# Patient Record
Sex: MALE | Race: WHITE | NOT HISPANIC OR LATINO | Employment: UNEMPLOYED | URBAN - METROPOLITAN AREA
[De-identification: names, ages, dates, MRNs, and addresses within clinical notes are randomized per-mention and may not be internally consistent; named-entity substitution may affect disease eponyms.]

---

## 2017-01-01 ENCOUNTER — LAB (OUTPATIENT)
Dept: LAB | Facility: HOSPITAL | Age: 57
End: 2017-01-01

## 2017-01-01 ENCOUNTER — OFFICE VISIT (OUTPATIENT)
Dept: ONCOLOGY | Facility: CLINIC | Age: 57
End: 2017-01-01

## 2017-01-01 ENCOUNTER — HOSPITAL ENCOUNTER (OUTPATIENT)
Dept: CT IMAGING | Facility: HOSPITAL | Age: 57
Discharge: HOME | End: 2017-07-17
Attending: INTERNAL MEDICINE | Admitting: INTERNAL MEDICINE

## 2017-01-01 ENCOUNTER — APPOINTMENT (OUTPATIENT)
Dept: ONCOLOGY | Facility: HOSPITAL | Age: 57
End: 2017-01-01

## 2017-01-01 ENCOUNTER — HOSPITAL ENCOUNTER (OUTPATIENT)
Dept: CT IMAGING | Facility: HOSPITAL | Age: 57
Discharge: HOME | End: 2017-12-04
Attending: INTERNAL MEDICINE | Admitting: INTERNAL MEDICINE

## 2017-01-01 ENCOUNTER — INFUSION (OUTPATIENT)
Dept: ONCOLOGY | Facility: HOSPITAL | Age: 57
End: 2017-01-01

## 2017-01-01 ENCOUNTER — TELEPHONE (OUTPATIENT)
Dept: ONCOLOGY | Facility: CLINIC | Age: 57
End: 2017-01-01

## 2017-01-01 VITALS
DIASTOLIC BLOOD PRESSURE: 97 MMHG | TEMPERATURE: 97.6 F | HEART RATE: 95 BPM | RESPIRATION RATE: 15 BRPM | WEIGHT: 192 LBS | SYSTOLIC BLOOD PRESSURE: 180 MMHG | BODY MASS INDEX: 29.19 KG/M2

## 2017-01-01 VITALS
DIASTOLIC BLOOD PRESSURE: 95 MMHG | SYSTOLIC BLOOD PRESSURE: 198 MMHG | TEMPERATURE: 97.1 F | HEART RATE: 78 BPM | RESPIRATION RATE: 21 BRPM | BODY MASS INDEX: 28.89 KG/M2 | WEIGHT: 190 LBS

## 2017-01-01 VITALS
RESPIRATION RATE: 18 BRPM | TEMPERATURE: 97 F | WEIGHT: 190.1 LBS | BODY MASS INDEX: 28.9 KG/M2 | SYSTOLIC BLOOD PRESSURE: 206 MMHG | DIASTOLIC BLOOD PRESSURE: 101 MMHG | HEART RATE: 83 BPM

## 2017-01-01 VITALS
WEIGHT: 196 LBS | TEMPERATURE: 97.2 F | BODY MASS INDEX: 29.8 KG/M2 | SYSTOLIC BLOOD PRESSURE: 179 MMHG | RESPIRATION RATE: 18 BRPM | HEART RATE: 88 BPM | DIASTOLIC BLOOD PRESSURE: 83 MMHG

## 2017-01-01 VITALS
BODY MASS INDEX: 29.04 KG/M2 | SYSTOLIC BLOOD PRESSURE: 166 MMHG | DIASTOLIC BLOOD PRESSURE: 90 MMHG | HEART RATE: 106 BPM | RESPIRATION RATE: 18 BRPM | TEMPERATURE: 97.2 F | WEIGHT: 191 LBS

## 2017-01-01 DIAGNOSIS — C77.9 METASTATIC MELANOMA TO LYMPH NODE (HCC): ICD-10-CM

## 2017-01-01 DIAGNOSIS — C77.9 METASTATIC MELANOMA TO LYMPH NODE (HCC): Primary | ICD-10-CM

## 2017-01-01 LAB
ALBUMIN SERPL-MCNC: 3.7 G/DL (ref 3.2–4.8)
ALBUMIN SERPL-MCNC: 4.1 G/DL (ref 3.5–5)
ALBUMIN SERPL-MCNC: 4.2 G/DL (ref 3.5–5)
ALBUMIN SERPL-MCNC: 4.3 G/DL (ref 3.5–5)
ALBUMIN/GLOB SERPL: 0.8 G/DL (ref 1.5–2.5)
ALBUMIN/GLOB SERPL: 0.9 G/DL (ref 1–2)
ALBUMIN/GLOB SERPL: 1.1 G/DL (ref 1–2)
ALBUMIN/GLOB SERPL: 1.1 G/DL (ref 1–2)
ALP SERPL-CCNC: 190 U/L (ref 38–126)
ALP SERPL-CCNC: 205 U/L (ref 25–100)
ALP SERPL-CCNC: 208 U/L (ref 38–126)
ALP SERPL-CCNC: 293 U/L (ref 38–126)
ALT SERPL W P-5'-P-CCNC: 128 U/L (ref 13–69)
ALT SERPL W P-5'-P-CCNC: 141 U/L (ref 13–69)
ALT SERPL W P-5'-P-CCNC: 52 U/L (ref 7–40)
ALT SERPL W P-5'-P-CCNC: 66 U/L (ref 13–69)
ANION GAP SERPL CALCULATED.3IONS-SCNC: 11 MMOL/L (ref 3–11)
ANION GAP SERPL CALCULATED.3IONS-SCNC: 16 MMOL/L
ANION GAP SERPL CALCULATED.3IONS-SCNC: 16.1 MMOL/L
ANION GAP SERPL CALCULATED.3IONS-SCNC: 16.3 MMOL/L
AST SERPL-CCNC: 102 U/L (ref 0–33)
AST SERPL-CCNC: 146 U/L (ref 15–46)
AST SERPL-CCNC: 209 U/L (ref 15–46)
AST SERPL-CCNC: 384 U/L (ref 15–46)
BASOPHILS # BLD AUTO: 0.03 10*3/MM3 (ref 0–0.2)
BASOPHILS # BLD AUTO: 0.05 10*3/MM3 (ref 0–0.2)
BASOPHILS # BLD AUTO: 0.06 10*3/MM3 (ref 0–0.2)
BASOPHILS # BLD AUTO: 0.13 10*3/MM3 (ref 0–0.2)
BASOPHILS NFR BLD AUTO: 0.5 % (ref 0–1)
BASOPHILS NFR BLD AUTO: 0.5 % (ref 0–2.5)
BASOPHILS NFR BLD AUTO: 0.8 % (ref 0–2.5)
BASOPHILS NFR BLD AUTO: 1.6 % (ref 0–2.5)
BILIRUB SERPL-MCNC: 1.4 MG/DL (ref 0.3–1.2)
BILIRUB SERPL-MCNC: 2.8 MG/DL (ref 0.2–1.3)
BILIRUB SERPL-MCNC: 3 MG/DL (ref 0.2–1.3)
BILIRUB SERPL-MCNC: 4 MG/DL (ref 0.2–1.3)
BUN BLD-MCNC: 3 MG/DL (ref 7–20)
BUN BLD-MCNC: 4 MG/DL (ref 7–20)
BUN BLD-MCNC: 6 MG/DL (ref 9–23)
BUN BLD-MCNC: 8 MG/DL (ref 7–20)
BUN/CREAT SERPL: 11.4 (ref 6.3–21.9)
BUN/CREAT SERPL: 4 (ref 6.3–21.9)
BUN/CREAT SERPL: 4.3 (ref 6.3–21.9)
BUN/CREAT SERPL: 7.5 (ref 7–25)
CALCIUM SPEC-SCNC: 8.6 MG/DL (ref 8.4–10.2)
CALCIUM SPEC-SCNC: 9 MG/DL (ref 8.4–10.2)
CALCIUM SPEC-SCNC: 9.2 MG/DL (ref 8.4–10.2)
CALCIUM SPEC-SCNC: 9.4 MG/DL (ref 8.7–10.4)
CHLORIDE SERPL-SCNC: 92 MMOL/L (ref 99–109)
CHLORIDE SERPL-SCNC: 96 MMOL/L (ref 98–107)
CHLORIDE SERPL-SCNC: 96 MMOL/L (ref 98–107)
CHLORIDE SERPL-SCNC: 97 MMOL/L (ref 98–107)
CO2 SERPL-SCNC: 27 MMOL/L (ref 26–30)
CO2 SERPL-SCNC: 28 MMOL/L (ref 20–31)
CO2 SERPL-SCNC: 28 MMOL/L (ref 26–30)
CO2 SERPL-SCNC: 30 MMOL/L (ref 26–30)
CREAT BLD-MCNC: 0.7 MG/DL (ref 0.6–1.3)
CREAT BLD-MCNC: 0.7 MG/DL (ref 0.6–1.3)
CREAT BLD-MCNC: 0.8 MG/DL (ref 0.6–1.3)
CREAT BLD-MCNC: 1 MG/DL (ref 0.6–1.3)
CREAT BLDA-MCNC: 0.8 MG/DL (ref 0.6–1.3)
DEPRECATED RDW RBC AUTO: 47.7 FL (ref 37–54)
DEPRECATED RDW RBC AUTO: 48.1 FL (ref 37–54)
DEPRECATED RDW RBC AUTO: 49.1 FL (ref 37–54)
DEPRECATED RDW RBC AUTO: 53.6 FL (ref 37–54)
EOSINOPHIL # BLD AUTO: 0.09 10*3/MM3 (ref 0–0.3)
EOSINOPHIL # BLD AUTO: 0.19 10*3/MM3 (ref 0–0.7)
EOSINOPHIL # BLD AUTO: 0.65 10*3/MM3 (ref 0–0.7)
EOSINOPHIL # BLD AUTO: 1.24 10*3/MM3 (ref 0–0.7)
EOSINOPHIL NFR BLD AUTO: 1.4 % (ref 0–3)
EOSINOPHIL NFR BLD AUTO: 15.6 % (ref 0–7)
EOSINOPHIL NFR BLD AUTO: 3.1 % (ref 0–7)
EOSINOPHIL NFR BLD AUTO: 6 % (ref 0–7)
ERYTHROCYTE [DISTWIDTH] IN BLOOD BY AUTOMATED COUNT: 12.8 % (ref 11.5–14.5)
ERYTHROCYTE [DISTWIDTH] IN BLOOD BY AUTOMATED COUNT: 13.2 % (ref 11.5–14.5)
ERYTHROCYTE [DISTWIDTH] IN BLOOD BY AUTOMATED COUNT: 13.3 % (ref 11.5–14.5)
ERYTHROCYTE [DISTWIDTH] IN BLOOD BY AUTOMATED COUNT: 14.9 % (ref 11.3–14.5)
GFR SERPL CREATININE-BSD FRML MDRD: 101 ML/MIN/1.73
GFR SERPL CREATININE-BSD FRML MDRD: 117 ML/MIN/1.73
GFR SERPL CREATININE-BSD FRML MDRD: 117 ML/MIN/1.73
GFR SERPL CREATININE-BSD FRML MDRD: 78 ML/MIN/1.73
GLOBULIN UR ELPH-MCNC: 3.6 GM/DL
GLOBULIN UR ELPH-MCNC: 3.9 GM/DL
GLOBULIN UR ELPH-MCNC: 4.5 GM/DL
GLOBULIN UR ELPH-MCNC: 4.7 GM/DL
GLUCOSE BLD-MCNC: 146 MG/DL (ref 74–98)
GLUCOSE BLD-MCNC: 173 MG/DL (ref 74–98)
GLUCOSE BLD-MCNC: 177 MG/DL (ref 74–98)
GLUCOSE BLD-MCNC: 258 MG/DL (ref 70–100)
HCT VFR BLD AUTO: 35.6 % (ref 42–52)
HCT VFR BLD AUTO: 39.6 % (ref 42–52)
HCT VFR BLD AUTO: 45.8 % (ref 42–52)
HCT VFR BLD AUTO: 48.9 % (ref 38.9–50.9)
HGB BLD-MCNC: 12.4 G/DL (ref 14–18)
HGB BLD-MCNC: 13.6 G/DL (ref 14–18)
HGB BLD-MCNC: 16.1 G/DL (ref 14–18)
HGB BLD-MCNC: 16.6 G/DL (ref 13.1–17.5)
IMM GRANULOCYTES # BLD: 0.03 10*3/MM3 (ref 0–0.03)
IMM GRANULOCYTES # BLD: 0.04 10*3/MM3 (ref 0–0.06)
IMM GRANULOCYTES # BLD: 0.04 10*3/MM3 (ref 0–0.06)
IMM GRANULOCYTES # BLD: 0.13 10*3/MM3 (ref 0–0.06)
IMM GRANULOCYTES NFR BLD: 0.4 % (ref 0–0.6)
IMM GRANULOCYTES NFR BLD: 0.5 % (ref 0–0.6)
IMM GRANULOCYTES NFR BLD: 0.5 % (ref 0–0.6)
IMM GRANULOCYTES NFR BLD: 2.1 % (ref 0–0.6)
LYMPHOCYTES # BLD AUTO: 1.02 10*3/MM3 (ref 0.6–3.4)
LYMPHOCYTES # BLD AUTO: 1.07 10*3/MM3 (ref 0.6–3.4)
LYMPHOCYTES # BLD AUTO: 1.17 10*3/MM3 (ref 0.6–3.4)
LYMPHOCYTES # BLD AUTO: 1.23 10*3/MM3 (ref 0.6–4.8)
LYMPHOCYTES NFR BLD AUTO: 14.7 % (ref 10–50)
LYMPHOCYTES NFR BLD AUTO: 16.7 % (ref 10–50)
LYMPHOCYTES NFR BLD AUTO: 18.6 % (ref 24–44)
LYMPHOCYTES NFR BLD AUTO: 9.8 % (ref 10–50)
MCH RBC QN AUTO: 33.4 PG (ref 27–31)
MCH RBC QN AUTO: 34.5 PG (ref 27–31)
MCH RBC QN AUTO: 34.8 PG (ref 27–31)
MCH RBC QN AUTO: 35 PG (ref 27–31)
MCHC RBC AUTO-ENTMCNC: 33.9 G/DL (ref 32–36)
MCHC RBC AUTO-ENTMCNC: 34.3 G/DL (ref 30–37)
MCHC RBC AUTO-ENTMCNC: 34.8 G/DL (ref 30–37)
MCHC RBC AUTO-ENTMCNC: 35.2 G/DL (ref 30–37)
MCV RBC AUTO: 100.6 FL (ref 80–94)
MCV RBC AUTO: 101.3 FL (ref 80–94)
MCV RBC AUTO: 98.1 FL (ref 80–94)
MCV RBC AUTO: 98.4 FL (ref 80–99)
MONOCYTES # BLD AUTO: 0.4 10*3/MM3 (ref 0–1)
MONOCYTES # BLD AUTO: 0.58 10*3/MM3 (ref 0–0.9)
MONOCYTES # BLD AUTO: 0.66 10*3/MM3 (ref 0–0.9)
MONOCYTES # BLD AUTO: 0.92 10*3/MM3 (ref 0–0.9)
MONOCYTES NFR BLD AUTO: 10.8 % (ref 0–12)
MONOCYTES NFR BLD AUTO: 6.1 % (ref 0–12)
MONOCYTES NFR BLD AUTO: 7.3 % (ref 0–12)
MONOCYTES NFR BLD AUTO: 8.4 % (ref 0–12)
NEUTROPHILS # BLD AUTO: 4.07 10*3/MM3 (ref 2–6.9)
NEUTROPHILS # BLD AUTO: 4.81 10*3/MM3 (ref 2–6.9)
NEUTROPHILS # BLD AUTO: 4.82 10*3/MM3 (ref 1.5–8.3)
NEUTROPHILS # BLD AUTO: 8.18 10*3/MM3 (ref 2–6.9)
NEUTROPHILS NFR BLD AUTO: 60.3 % (ref 37–80)
NEUTROPHILS NFR BLD AUTO: 66.5 % (ref 37–80)
NEUTROPHILS NFR BLD AUTO: 72.9 % (ref 41–71)
NEUTROPHILS NFR BLD AUTO: 74.9 % (ref 37–80)
NRBC BLD MANUAL-RTO: 0 /100 WBC (ref 0–0)
PLATELET # BLD AUTO: 123 10*3/MM3 (ref 150–450)
PLATELET # BLD AUTO: 133 10*3/MM3 (ref 130–400)
PLATELET # BLD AUTO: 159 10*3/MM3 (ref 130–400)
PLATELET # BLD AUTO: 164 10*3/MM3 (ref 130–400)
PMV BLD AUTO: 10.2 FL (ref 6–12)
PMV BLD AUTO: 10.3 FL (ref 6–12)
PMV BLD AUTO: 10.3 FL (ref 6–12)
PMV BLD AUTO: 10.6 FL (ref 6–12)
POTASSIUM BLD-SCNC: 4 MMOL/L (ref 3.5–5.1)
POTASSIUM BLD-SCNC: 4.1 MMOL/L (ref 3.5–5.1)
POTASSIUM BLD-SCNC: 4.2 MMOL/L (ref 3.5–5.5)
POTASSIUM BLD-SCNC: 4.3 MMOL/L (ref 3.5–5.1)
PROT SERPL-MCNC: 7.7 G/DL (ref 6.3–8.2)
PROT SERPL-MCNC: 8.2 G/DL (ref 5.7–8.2)
PROT SERPL-MCNC: 8.2 G/DL (ref 6.3–8.2)
PROT SERPL-MCNC: 8.9 G/DL (ref 6.3–8.2)
RBC # BLD AUTO: 3.54 10*6/MM3 (ref 4.7–6.1)
RBC # BLD AUTO: 3.91 10*6/MM3 (ref 4.7–6.1)
RBC # BLD AUTO: 4.67 10*6/MM3 (ref 4.7–6.1)
RBC # BLD AUTO: 4.97 10*6/MM3 (ref 4.2–5.76)
SODIUM BLD-SCNC: 131 MMOL/L (ref 132–146)
SODIUM BLD-SCNC: 135 MMOL/L (ref 137–145)
SODIUM BLD-SCNC: 137 MMOL/L (ref 137–145)
SODIUM BLD-SCNC: 138 MMOL/L (ref 137–145)
T4 FREE SERPL-MCNC: 0.37 NG/DL (ref 0.89–1.76)
T4 FREE SERPL-MCNC: 1.05 NG/DL (ref 0.78–2.19)
T4 FREE SERPL-MCNC: <0.07 NG/DL (ref 0.78–2.19)
TSH SERPL DL<=0.05 MIU/L-ACNC: 20.4 MIU/ML (ref 0.47–4.68)
TSH SERPL DL<=0.05 MIU/L-ACNC: 48.2 MIU/ML (ref 0.35–5.35)
TSH SERPL DL<=0.05 MIU/L-ACNC: 75.8 MIU/ML (ref 0.47–4.68)
WBC NRBC COR # BLD: 10.92 10*3/MM3 (ref 4.8–10.8)
WBC NRBC COR # BLD: 6.12 10*3/MM3 (ref 4.8–10.8)
WBC NRBC COR # BLD: 6.6 10*3/MM3 (ref 3.5–10.8)
WBC NRBC COR # BLD: 7.97 10*3/MM3 (ref 4.8–10.8)

## 2017-01-01 PROCEDURE — 80050 GENERAL HEALTH PANEL: CPT

## 2017-01-01 PROCEDURE — 96413 CHEMO IV INFUSION 1 HR: CPT

## 2017-01-01 PROCEDURE — G0463 HOSPITAL OUTPT CLINIC VISIT: HCPCS

## 2017-01-01 PROCEDURE — 36415 COLL VENOUS BLD VENIPUNCTURE: CPT

## 2017-01-01 PROCEDURE — 0 IOPAMIDOL 61 % SOLUTION: Performed by: INTERNAL MEDICINE

## 2017-01-01 PROCEDURE — 84439 ASSAY OF FREE THYROXINE: CPT

## 2017-01-01 PROCEDURE — 99215 OFFICE O/P EST HI 40 MIN: CPT | Performed by: INTERNAL MEDICINE

## 2017-01-01 PROCEDURE — 84439 ASSAY OF FREE THYROXINE: CPT | Performed by: INTERNAL MEDICINE

## 2017-01-01 PROCEDURE — 25010000002 NIVOLUMAB 100 MG/10ML SOLUTION 10 ML VIAL: Performed by: NURSE PRACTITIONER

## 2017-01-01 PROCEDURE — 71260 CT THORAX DX C+: CPT

## 2017-01-01 PROCEDURE — 25010000002 NIVOLUMAB 100 MG/10ML SOLUTION 4 ML VIAL: Performed by: NURSE PRACTITIONER

## 2017-01-01 PROCEDURE — 74177 CT ABD & PELVIS W/CONTRAST: CPT

## 2017-01-01 PROCEDURE — 80050 GENERAL HEALTH PANEL: CPT | Performed by: INTERNAL MEDICINE

## 2017-01-01 PROCEDURE — 82565 ASSAY OF CREATININE: CPT

## 2017-01-01 PROCEDURE — 80053 COMPREHEN METABOLIC PANEL: CPT | Performed by: INTERNAL MEDICINE

## 2017-01-01 PROCEDURE — 85025 COMPLETE CBC W/AUTO DIFF WBC: CPT | Performed by: INTERNAL MEDICINE

## 2017-01-01 RX ORDER — OXYCODONE HYDROCHLORIDE 5 MG/1
5 TABLET ORAL EVERY 6 HOURS PRN
Qty: 120 TABLET | Refills: 0 | Status: SHIPPED | OUTPATIENT
Start: 2017-01-01 | End: 2017-01-01 | Stop reason: SDUPTHER

## 2017-01-01 RX ORDER — LISINOPRIL 10 MG/1
10 TABLET ORAL DAILY
Qty: 30 TABLET | Refills: 2 | Status: SHIPPED | OUTPATIENT
Start: 2017-01-01 | End: 2018-01-01 | Stop reason: SDUPTHER

## 2017-01-01 RX ORDER — SODIUM CHLORIDE 9 MG/ML
250 INJECTION, SOLUTION INTRAVENOUS ONCE
Status: CANCELLED | OUTPATIENT
Start: 2017-01-01

## 2017-01-01 RX ORDER — LEVOTHYROXINE SODIUM 0.07 MG/1
75 TABLET ORAL DAILY
Qty: 30 TABLET | Refills: 3 | Status: SHIPPED | OUTPATIENT
Start: 2017-01-01 | End: 2017-01-01 | Stop reason: DRUGHIGH

## 2017-01-01 RX ORDER — LEVOTHYROXINE SODIUM 0.05 MG/1
50 TABLET ORAL DAILY
Qty: 7 TABLET | Refills: 0 | Status: SHIPPED | OUTPATIENT
Start: 2017-01-01 | End: 2017-01-01 | Stop reason: DRUGHIGH

## 2017-01-01 RX ORDER — PREDNISONE 10 MG/1
10 TABLET ORAL DAILY
Qty: 189 TABLET | Refills: 0 | Status: SHIPPED | OUTPATIENT
Start: 2017-01-01 | End: 2018-01-01 | Stop reason: SDUPTHER

## 2017-01-01 RX ORDER — SODIUM CHLORIDE 9 MG/ML
250 INJECTION, SOLUTION INTRAVENOUS ONCE
Status: COMPLETED | OUTPATIENT
Start: 2017-01-01 | End: 2017-01-01

## 2017-01-01 RX ORDER — LEVOTHYROXINE SODIUM 0.1 MG/1
100 TABLET ORAL DAILY
Qty: 30 TABLET | Refills: 5 | Status: SHIPPED | OUTPATIENT
Start: 2017-01-01

## 2017-01-01 RX ORDER — OXYCODONE HYDROCHLORIDE AND ACETAMINOPHEN 5; 325 MG/1; MG/1
1 TABLET ORAL EVERY 6 HOURS PRN
Qty: 120 TABLET | Refills: 0 | Status: SHIPPED | OUTPATIENT
Start: 2017-01-01 | End: 2017-01-01 | Stop reason: HOSPADM

## 2017-01-01 RX ORDER — ZOLPIDEM TARTRATE 5 MG/1
5 TABLET ORAL NIGHTLY PRN
Qty: 30 TABLET | Refills: 2 | OUTPATIENT
Start: 2017-01-01

## 2017-01-01 RX ORDER — OXYCODONE HYDROCHLORIDE 5 MG/1
5 TABLET ORAL EVERY 6 HOURS PRN
Qty: 120 TABLET | Refills: 0 | Status: SHIPPED | OUTPATIENT
Start: 2017-01-01 | End: 2018-01-01 | Stop reason: SDUPTHER

## 2017-01-01 RX ORDER — ONDANSETRON HYDROCHLORIDE 8 MG/1
8 TABLET, FILM COATED ORAL 3 TIMES DAILY PRN
Qty: 30 TABLET | Refills: 5 | Status: SHIPPED | OUTPATIENT
Start: 2017-01-01

## 2017-01-01 RX ORDER — LEVOTHYROXINE SODIUM 0.07 MG/1
75 TABLET ORAL DAILY
Qty: 30 TABLET | Refills: 3 | Status: SHIPPED | OUTPATIENT
Start: 2017-01-01 | End: 2017-01-01 | Stop reason: SDUPTHER

## 2017-01-01 RX ADMIN — SODIUM CHLORIDE 240 MG: 9 INJECTION, SOLUTION INTRAVENOUS at 11:27

## 2017-01-01 RX ADMIN — IOPAMIDOL 92 ML: 612 INJECTION, SOLUTION INTRAVENOUS at 13:00

## 2017-01-01 RX ADMIN — SODIUM CHLORIDE 250 ML: 9 INJECTION, SOLUTION INTRAVENOUS at 11:29

## 2017-01-01 RX ADMIN — IOPAMIDOL 95 ML: 612 INJECTION, SOLUTION INTRAVENOUS at 14:20

## 2017-02-12 ENCOUNTER — HOSPITAL ENCOUNTER (EMERGENCY)
Facility: HOSPITAL | Age: 57
Discharge: HOME | End: 2017-02-12
Attending: EMERGENCY MEDICINE | Admitting: EMERGENCY MEDICINE

## 2017-02-12 ENCOUNTER — APPOINTMENT (OUTPATIENT)
Dept: GENERAL RADIOLOGY | Facility: HOSPITAL | Age: 57
End: 2017-02-12

## 2017-02-12 ENCOUNTER — APPOINTMENT (OUTPATIENT)
Dept: CT IMAGING | Facility: HOSPITAL | Age: 57
End: 2017-02-12

## 2017-02-12 ENCOUNTER — APPOINTMENT (OUTPATIENT)
Dept: ULTRASOUND IMAGING | Facility: HOSPITAL | Age: 57
End: 2017-02-12

## 2017-02-12 VITALS
HEIGHT: 69 IN | WEIGHT: 245 LBS | OXYGEN SATURATION: 96 % | BODY MASS INDEX: 36.29 KG/M2 | TEMPERATURE: 98 F | DIASTOLIC BLOOD PRESSURE: 102 MMHG | SYSTOLIC BLOOD PRESSURE: 195 MMHG | HEART RATE: 88 BPM | RESPIRATION RATE: 20 BRPM

## 2017-02-12 DIAGNOSIS — R59.9 LYMPH NODE ENLARGEMENT: ICD-10-CM

## 2017-02-12 DIAGNOSIS — T07.XXXA MULTIPLE CONTUSIONS: Primary | ICD-10-CM

## 2017-02-12 LAB
ALBUMIN SERPL-MCNC: 4.2 G/DL (ref 3.5–5)
ALBUMIN/GLOB SERPL: 1.1 G/DL (ref 1–2)
ALP SERPL-CCNC: 127 U/L (ref 38–126)
ALT SERPL W P-5'-P-CCNC: 24 U/L (ref 13–69)
AMPHET+METHAMPHET UR QL: NEGATIVE
AMPHETAMINES UR QL: NEGATIVE
ANION GAP SERPL CALCULATED.3IONS-SCNC: 17.2 MMOL/L
AST SERPL-CCNC: 47 U/L (ref 15–46)
BARBITURATES UR QL SCN: NEGATIVE
BASOPHILS # BLD AUTO: 0.04 10*3/MM3 (ref 0–0.2)
BASOPHILS NFR BLD AUTO: 0.6 % (ref 0–2.5)
BENZODIAZ UR QL SCN: NEGATIVE
BILIRUB SERPL-MCNC: 1.1 MG/DL (ref 0.2–1.3)
BUN BLD-MCNC: 5 MG/DL (ref 7–20)
BUN/CREAT SERPL: 10 (ref 6.3–21.9)
BUPRENORPHINE SERPL-MCNC: NEGATIVE NG/ML
CALCIUM SPEC-SCNC: 9.1 MG/DL (ref 8.4–10.2)
CANNABINOIDS SERPL QL: NEGATIVE
CHLORIDE SERPL-SCNC: 99 MMOL/L (ref 98–107)
CO2 SERPL-SCNC: 27 MMOL/L (ref 26–30)
COCAINE UR QL: NEGATIVE
CREAT BLD-MCNC: 0.5 MG/DL (ref 0.6–1.3)
DEPRECATED RDW RBC AUTO: 41.3 FL (ref 37–54)
EOSINOPHIL # BLD AUTO: 0.14 10*3/MM3 (ref 0–0.7)
EOSINOPHIL NFR BLD AUTO: 2.3 % (ref 0–7)
ERYTHROCYTE [DISTWIDTH] IN BLOOD BY AUTOMATED COUNT: 12.6 % (ref 11.5–14.5)
ETHANOL BLD-MCNC: 233 MG/DL
ETHANOL UR QL: 0.23 %
GFR SERPL CREATININE-BSD FRML MDRD: >150 ML/MIN/1.73
GLOBULIN UR ELPH-MCNC: 3.8 GM/DL
GLUCOSE BLD-MCNC: 276 MG/DL (ref 74–98)
HCT VFR BLD AUTO: 51.5 % (ref 42–52)
HGB BLD-MCNC: 18.4 G/DL (ref 14–18)
IMM GRANULOCYTES # BLD: 0.02 10*3/MM3 (ref 0–0.06)
IMM GRANULOCYTES NFR BLD: 0.3 % (ref 0–0.6)
LIPASE SERPL-CCNC: 67 U/L (ref 23–300)
LYMPHOCYTES # BLD AUTO: 1.67 10*3/MM3 (ref 0.6–3.4)
LYMPHOCYTES NFR BLD AUTO: 26.9 % (ref 10–50)
MCH RBC QN AUTO: 32.2 PG (ref 27–31)
MCHC RBC AUTO-ENTMCNC: 35.7 G/DL (ref 30–37)
MCV RBC AUTO: 90 FL (ref 80–94)
METHADONE UR QL SCN: NEGATIVE
MONOCYTES # BLD AUTO: 0.66 10*3/MM3 (ref 0–0.9)
MONOCYTES NFR BLD AUTO: 10.6 % (ref 0–12)
NEUTROPHILS # BLD AUTO: 3.67 10*3/MM3 (ref 2–6.9)
NEUTROPHILS NFR BLD AUTO: 59.3 % (ref 37–80)
NRBC BLD MANUAL-RTO: 0 /100 WBC (ref 0–0)
OPIATES UR QL: NEGATIVE
OXYCODONE UR QL SCN: NEGATIVE
PCP UR QL SCN: NEGATIVE
PLATELET # BLD AUTO: 135 10*3/MM3 (ref 130–400)
PMV BLD AUTO: 10.5 FL (ref 6–12)
POTASSIUM BLD-SCNC: 4.2 MMOL/L (ref 3.5–5.1)
PROPOXYPH UR QL: NEGATIVE
PROT SERPL-MCNC: 8 G/DL (ref 6.3–8.2)
RBC # BLD AUTO: 5.72 10*6/MM3 (ref 4.7–6.1)
SODIUM BLD-SCNC: 139 MMOL/L (ref 137–145)
TRICYCLICS UR QL SCN: NEGATIVE
TROPONIN I SERPL-MCNC: 0.03 NG/ML (ref 0–0.03)
WBC NRBC COR # BLD: 6.2 10*3/MM3 (ref 4.8–10.8)

## 2017-02-12 PROCEDURE — 70450 CT HEAD/BRAIN W/O DYE: CPT

## 2017-02-12 PROCEDURE — 96365 THER/PROPH/DIAG IV INF INIT: CPT

## 2017-02-12 PROCEDURE — 72072 X-RAY EXAM THORAC SPINE 3VWS: CPT

## 2017-02-12 PROCEDURE — 36415 COLL VENOUS BLD VENIPUNCTURE: CPT

## 2017-02-12 PROCEDURE — 80053 COMPREHEN METABOLIC PANEL: CPT | Performed by: PHYSICIAN ASSISTANT

## 2017-02-12 PROCEDURE — 0 IOPAMIDOL 61 % SOLUTION: Performed by: EMERGENCY MEDICINE

## 2017-02-12 PROCEDURE — 83690 ASSAY OF LIPASE: CPT | Performed by: PHYSICIAN ASSISTANT

## 2017-02-12 PROCEDURE — 93923 UPR/LXTR ART STDY 3+ LVLS: CPT

## 2017-02-12 PROCEDURE — 25010000002 MAGNESIUM SULFATE PER 500 MG OF MAGNESIUM: Performed by: PHYSICIAN ASSISTANT

## 2017-02-12 PROCEDURE — 99284 EMERGENCY DEPT VISIT MOD MDM: CPT

## 2017-02-12 PROCEDURE — 71020 HC CHEST PA AND LATERAL: CPT

## 2017-02-12 PROCEDURE — 93005 ELECTROCARDIOGRAM TRACING: CPT | Performed by: PHYSICIAN ASSISTANT

## 2017-02-12 PROCEDURE — 96375 TX/PRO/DX INJ NEW DRUG ADDON: CPT

## 2017-02-12 PROCEDURE — 25810000003 DEXTROSE-NACL PER 500 ML: Performed by: PHYSICIAN ASSISTANT

## 2017-02-12 PROCEDURE — 74177 CT ABD & PELVIS W/CONTRAST: CPT

## 2017-02-12 PROCEDURE — 85025 COMPLETE CBC W/AUTO DIFF WBC: CPT | Performed by: PHYSICIAN ASSISTANT

## 2017-02-12 PROCEDURE — 96366 THER/PROPH/DIAG IV INF ADDON: CPT

## 2017-02-12 PROCEDURE — 72040 X-RAY EXAM NECK SPINE 2-3 VW: CPT

## 2017-02-12 PROCEDURE — 80307 DRUG TEST PRSMV CHEM ANLYZR: CPT | Performed by: PHYSICIAN ASSISTANT

## 2017-02-12 PROCEDURE — 80306 DRUG TEST PRSMV INSTRMNT: CPT | Performed by: PHYSICIAN ASSISTANT

## 2017-02-12 PROCEDURE — 63710000001 INSULIN REGULAR HUMAN PER 5 UNITS: Performed by: PHYSICIAN ASSISTANT

## 2017-02-12 PROCEDURE — 25010000002 THIAMINE PER 100 MG: Performed by: PHYSICIAN ASSISTANT

## 2017-02-12 PROCEDURE — 72100 X-RAY EXAM L-S SPINE 2/3 VWS: CPT

## 2017-02-12 PROCEDURE — 84484 ASSAY OF TROPONIN QUANT: CPT | Performed by: PHYSICIAN ASSISTANT

## 2017-02-12 RX ORDER — AMOXICILLIN AND CLAVULANATE POTASSIUM 500; 125 MG/1; MG/1
1 TABLET, FILM COATED ORAL 2 TIMES DAILY
Qty: 14 TABLET | Refills: 0 | Status: SHIPPED | OUTPATIENT
Start: 2017-02-12 | End: 2017-03-16

## 2017-02-12 RX ORDER — AMOXICILLIN AND CLAVULANATE POTASSIUM 250; 125 MG/1; MG/1
2 TABLET, FILM COATED ORAL ONCE
Status: COMPLETED | OUTPATIENT
Start: 2017-02-12 | End: 2017-02-12

## 2017-02-12 RX ORDER — LABETALOL HYDROCHLORIDE 5 MG/ML
10 INJECTION, SOLUTION INTRAVENOUS ONCE
Status: COMPLETED | OUTPATIENT
Start: 2017-02-12 | End: 2017-02-12

## 2017-02-12 RX ORDER — SODIUM CHLORIDE 0.9 % (FLUSH) 0.9 %
10 SYRINGE (ML) INJECTION AS NEEDED
Status: DISCONTINUED | OUTPATIENT
Start: 2017-02-12 | End: 2017-02-12 | Stop reason: HOSPADM

## 2017-02-12 RX ADMIN — AMOXICILLIN AND CLAVULANATE POTASSIUM 2 TABLET: 250; 125 TABLET, FILM COATED ORAL at 19:50

## 2017-02-12 RX ADMIN — FOLIC ACID 1000 ML/HR: 5 INJECTION, SOLUTION INTRAMUSCULAR; INTRAVENOUS; SUBCUTANEOUS at 17:39

## 2017-02-12 RX ADMIN — HUMAN INSULIN 5 UNITS: 100 INJECTION, SOLUTION SUBCUTANEOUS at 18:56

## 2017-02-12 RX ADMIN — LABETALOL HYDROCHLORIDE 10 MG: 5 INJECTION, SOLUTION INTRAVENOUS at 17:39

## 2017-02-12 RX ADMIN — IOPAMIDOL 100 ML: 612 INJECTION, SOLUTION INTRAVENOUS at 18:39

## 2017-02-12 NOTE — ED PROVIDER NOTES
Subjective   HPI Comments: Patient is here with being brought in by EMS with complaint of alcohol abuse he does drink daily admits to falling earlier today no syncope states he slipped in the bathtub denies any LOC patient admits to some discomfort in his left groin area, he has noticed a nodular area for the past few days no fevers chills admits to some mild discomfort in the left lower abdomen and groin area.  Denies chest pain shortness of air, is uncertain if he hit his back when he fell complains of some mild discomfort in his back denies any other injuries presents here for further evaluation      Review of Systems   Constitutional: Negative.    HENT: Negative.    Eyes: Negative.    Respiratory: Negative.    Cardiovascular: Negative.  Negative for chest pain.   Gastrointestinal: Negative for nausea and vomiting.        Mild discomfort pain left groin   Endocrine: Negative.    Genitourinary: Negative.    Musculoskeletal: Positive for arthralgias and back pain. Negative for neck pain.   Skin: Negative.    Neurological: Negative.  Negative for headaches.   Hematological: Negative.    Psychiatric/Behavioral: Positive for self-injury.   All other systems reviewed and are negative.      Past Medical History   Diagnosis Date   • Arthritis    • Cancer      skin   • Gout    • Hypertension        No Known Allergies    History reviewed. No pertinent past surgical history.    History reviewed. No pertinent family history.    Social History     Social History   • Marital status: Single     Spouse name: N/A   • Number of children: N/A   • Years of education: N/A     Social History Main Topics   • Smoking status: Former Smoker   • Smokeless tobacco: None   • Alcohol use Yes      Comment: 4-5 cases week   • Drug use: No   • Sexual activity: Defer     Other Topics Concern   • None     Social History Narrative   • None           Objective   Physical Exam   Constitutional: He is oriented to person, place, and time. He appears  well-developed.   HENT:   Head: Normocephalic and atraumatic.   Right Ear: External ear normal.   Left Ear: External ear normal.   Mouth/Throat: Oropharynx is clear and moist. No oropharyngeal exudate (multiple dental caries).   Eyes: Conjunctivae and EOM are normal. Pupils are equal, round, and reactive to light.   Neck: Normal range of motion. Neck supple.   Cardiovascular: Normal rate, regular rhythm and intact distal pulses.    No murmur heard.  Pulses:       Femoral pulses are 1+ on the right side, and 1+ on the left side.       Popliteal pulses are 1+ on the right side, and 1+ on the left side.        Dorsalis pedis pulses are 1+ on the right side, and 1+ on the left side.   1+ DP nathalia   Pulmonary/Chest: Effort normal. He has rales.   Abdominal: Soft. There is tenderness (mild tenderness nodular type area left groin inguinal area approximately 4 x 4 centimeters). There is no guarding. No hernia.   Musculoskeletal: Normal range of motion.   Lymphadenopathy:     He has no cervical adenopathy.   Neurological: He is alert and oriented to person, place, and time.   Skin: Skin is warm and dry.   Psychiatric: He has a normal mood and affect. His behavior is normal.   Nursing note and vitals reviewed.      Procedures         ED Course  ED Course   Comment By Time   Since case management workup reviewed with Dr. Shukla... Will start him on antibiotics follow-up with PCP,.. And surgery follow-up further evaluation.... Return here for any problems with follow-up for any worsening in the next 3 days Gerald Sanders PA-C 02/12 1932   Had shanae discussion with patient that this could be infectious or neoplastic and needs follow-up to follow-up with PCP and recommend Dr. Wilson possible evaluation  To return here if he has any trouble with follow-up or any worsening symptoms or problems in the next 72 hours  Patient admonished to decrease his alcohol use he check of his blood sugars take his other medications as directed  Gerald Sanders PA-C 02/12 1942                  [facility]    Final diagnoses:   Multiple contusions   Lymph node enlargement            Gerald Sanders PA-C  02/12/17 1944

## 2017-02-13 NOTE — ED NOTES
/102, Gerald RAI aware, patient refused medication for BP, states he will follow up with PCP. Nephew driving patient.      Sara Monterroso RN  02/12/17 2004

## 2017-02-28 ENCOUNTER — OFFICE VISIT (OUTPATIENT)
Dept: SURGERY | Facility: CLINIC | Age: 57
End: 2017-02-28

## 2017-02-28 VITALS
SYSTOLIC BLOOD PRESSURE: 220 MMHG | RESPIRATION RATE: 16 BRPM | BODY MASS INDEX: 28.29 KG/M2 | DIASTOLIC BLOOD PRESSURE: 98 MMHG | TEMPERATURE: 98.2 F | WEIGHT: 191 LBS | HEIGHT: 69 IN | HEART RATE: 77 BPM

## 2017-02-28 DIAGNOSIS — R79.89 ABNORMAL LFTS: ICD-10-CM

## 2017-02-28 DIAGNOSIS — R59.0 LYMPHADENOPATHY OF OTHER SITE: Primary | ICD-10-CM

## 2017-02-28 DIAGNOSIS — C49.9 SARCOMA (HCC): ICD-10-CM

## 2017-02-28 DIAGNOSIS — I88.9 LYMPHADENITIS: Primary | ICD-10-CM

## 2017-02-28 PROCEDURE — 99204 OFFICE O/P NEW MOD 45 MIN: CPT | Performed by: SURGERY

## 2017-02-28 RX ORDER — SULFAMETHOXAZOLE AND TRIMETHOPRIM 800; 160 MG/1; MG/1
1 TABLET ORAL 2 TIMES DAILY
Qty: 14 TABLET | Refills: 0 | Status: SHIPPED | OUTPATIENT
Start: 2017-02-28 | End: 2017-03-07

## 2017-02-28 RX ORDER — LOSARTAN POTASSIUM 50 MG/1
50 TABLET ORAL DAILY
COMMUNITY
End: 2017-07-05

## 2017-02-28 NOTE — PROGRESS NOTES
Referring Provider: No ref. provider found    Reason for Consultation: Adenopathy    Patient Care Team:  Andressa Chacko MD as PCP - General    Chief complaint : I have an infection  Chief Complaint   Patient presents with   • Pain     pain @ left groin with redness/ burning        Subjective .     History of present illness:  Mr. Arroyo is here today regarding a nodule in the left groin.  He states he has had this for 7 weeks.  In the last 3 days it has caused him some pain and some redness.  No fever or chills.  The pain is localized.  Dull aching pain that is constant.  He feels no other nodules anywhere else.  No history of cat scratches or other injuries or infectious processes.  He has had a 30 pound weight loss in the last month.  He has vomiting once a week in the morning that occurs suddenly.  Otherwise complains of no other GI issues.  No abdominal pain.    Review of Systems   Constitutional: Positive for unexpected weight change. Negative for chills and fever.   HENT: Negative for trouble swallowing and voice change.    Eyes: Negative for visual disturbance.   Respiratory: Negative for apnea, cough, chest tightness, shortness of breath and wheezing.    Cardiovascular: Negative for chest pain, palpitations and leg swelling.   Gastrointestinal: Positive for vomiting. Negative for abdominal distention, abdominal pain, anal bleeding, blood in stool, constipation, diarrhea, nausea and rectal pain.   Endocrine: Negative for cold intolerance and heat intolerance.   Genitourinary: Negative for difficulty urinating, dysuria, flank pain, scrotal swelling and testicular pain.   Musculoskeletal: Negative for back pain, gait problem and joint swelling.   Skin: Negative for color change, rash and wound.   Neurological: Negative for dizziness, syncope, speech difficulty, weakness, numbness and headaches.   Hematological: Negative for adenopathy. Does not bruise/bleed easily.   Psychiatric/Behavioral:  "Negative for confusion. The patient is not nervous/anxious.        History  Past Medical History   Diagnosis Date   • Arthritis    • Cancer      skin/face   • Diabetes mellitus    • Gout    • Hypertension        Past Surgical History   Procedure Laterality Date   • Lip reconstruction         Family History   Problem Relation Age of Onset   • Cancer Mother      skin   • Cancer Father      throat   • Cancer Sister      breast       Social History   Substance Use Topics   • Smoking status: Former Smoker   • Smokeless tobacco: Never Used   • Alcohol use None      Comment: 4-5 cases week        Objective     Vital Signs   Visit Vitals   • BP (!) 220/98 (BP Location: Right arm, Patient Position: Sitting)   • Pulse 77   • Temp 98.2 °F (36.8 °C) (Oral)   • Resp 16   • Ht 69\" (175.3 cm)   • Wt 191 lb (86.6 kg)   • BMI 28.21 kg/m2       Physical Exam:     General Appearance:    Alert, cooperative, in no acute distress   Head:    Normocephalic, without obvious abnormality, atraumatic   Eyes:            Lids and lashes normal, conjunctivae and sclerae normal, no   icterus, no pallor, corneas clear, PERRLA   Ears:    Ears appear intact with no abnormalities noted   Neck:   No adenopathy, supple, trachea midline, no thyromegaly   Lungs:     Clear to auscultation,respirations regular, even and                  unlabored    Heart:    Regular rhythm and normal rate, normal S1 and S2, no            murmur, no gallop, no rub, no click   Chest Wall:    No abnormalities observed   Abdomen:     Normal bowel sounds, no masses, no organomegaly, soft        non-tender, non-distended, no guarding, no rebound                tenderness   Extremities:   Moves all extremities well, no edema, no cyanosis, no             redness   Pulses:   Pulses palpable and equal bilaterally   Skin:   No bleeding, bruising or rash but patient does have significant actinic keratoses in the sun exposed skin.     Lymph nodes:   No palpable adenopathy in the neck.  " He does have palpable infrainguinal lymph node measuring at least 4 cm in size.  Some overlying erythema and slight tenderness.     Neurologic:   Cranial nerves 2 - 12 grossly intact, sensation intact, DTR       present and equal bilaterally  Psychiatric: No evidence of depression or anxiety.         Results Review:   I reviewed the patient's new clinical results. personally reviewed the patient's laboratory studies and ultrasound and CT scan reports.  I reviewed the CT scan films myself.      Assessment/Plan : Lymphadenitis: Involving the left groin.  I recommend a fine-needle aspiration of this.  He understands procedure as well as the risk of bleeding and infection and he wished to proceed.  I'll also order Bactrim.  I'll follow him up on Friday.  He may ultimately require excision of this if it does not improve.  Abnormal LFTs and vomiting: Ultrasound of the gallbladder.            Teja Wilson MD  02/28/17  10:30 AM

## 2017-02-28 NOTE — PROGRESS NOTES
"    Referring Provider: No ref. provider found    Reason for Consultation: Ignore this note    Patient Care Team:  Andressa Chacko MD as PCP - General    Chief complaint   Chief Complaint   Patient presents with   • Pain     pain @ left groin with redness/ burning        Subjective .     History of present illness:   Review of Systems    History  Past Medical History   Diagnosis Date   • Arthritis    • Cancer      skin/face   • Diabetes mellitus    • Gout    • Hypertension        Past Surgical History   Procedure Laterality Date   • Lip reconstruction         Family History   Problem Relation Age of Onset   • Cancer Mother      skin   • Cancer Father      throat   • Cancer Sister      breast       Social History   Substance Use Topics   • Smoking status: Former Smoker   • Smokeless tobacco: Never Used   • Alcohol use None      Comment: 4-5 cases week        Objective     Vital Signs   Visit Vitals   • BP (!) 220/98 (BP Location: Right arm, Patient Position: Sitting)   • Pulse 77   • Temp 98.2 °F (36.8 °C) (Oral)   • Resp 16   • Ht 69\" (175.3 cm)   • Wt 191 lb (86.6 kg)   • BMI 28.21 kg/m2       Physical Exam:     General Appearance:    Alert, cooperative, in no acute distress   Head:    Normocephalic, without obvious abnormality, atraumatic   Eyes:            Lids and lashes normal, conjunctivae and sclerae normal, no   icterus, no pallor, corneas clear, PERRLA   Ears:    Ears appear intact with no abnormalities noted   Throat:   No oral lesions, no thrush, oral mucosa moist   Neck:   No adenopathy, supple, trachea midline, no thyromegaly, no   carotid bruit, no JVD   Back:     No kyphosis present, no scoliosis present, no skin lesions,      erythema or scars, no tenderness to percussion or                   palpation,   range of motion normal   Lungs:     Clear to auscultation,respirations regular, even and                  unlabored    Heart:    Regular rhythm and normal rate, normal S1 and S2, no        "     murmur, no gallop, no rub, no click   Chest Wall:    No abnormalities observed   Abdomen:     Normal bowel sounds, no masses, no organomegaly, soft        non-tender, non-distended, no guarding, no rebound                tenderness   Extremities:   Moves all extremities well, no edema, no cyanosis, no             redness   Pulses:   Pulses palpable and equal bilaterally   Skin:   No bleeding, bruising or rash   Lymph nodes:   No palpable adenopathy   Neurologic:   Cranial nerves 2 - 12 grossly intact, sensation intact, DTR       present and equal bilaterally       Results Review:   None      Assessment/Plan : Ignore this note        Elizabeth Nation MA  02/28/17  10:17 AM

## 2017-03-03 ENCOUNTER — OFFICE VISIT (OUTPATIENT)
Dept: SURGERY | Facility: CLINIC | Age: 57
End: 2017-03-03

## 2017-03-03 VITALS
TEMPERATURE: 98.4 F | SYSTOLIC BLOOD PRESSURE: 200 MMHG | BODY MASS INDEX: 28.29 KG/M2 | WEIGHT: 191 LBS | HEIGHT: 69 IN | DIASTOLIC BLOOD PRESSURE: 100 MMHG

## 2017-03-03 DIAGNOSIS — C80.1 MALIGNANCY (HCC): Primary | ICD-10-CM

## 2017-03-03 DIAGNOSIS — R59.1 LYMPHADENOPATHY: Primary | ICD-10-CM

## 2017-03-03 PROCEDURE — 99212 OFFICE O/P EST SF 10 MIN: CPT | Performed by: SURGERY

## 2017-03-03 PROCEDURE — 76942 ECHO GUIDE FOR BIOPSY: CPT | Performed by: SURGERY

## 2017-03-03 PROCEDURE — 38505 NEEDLE BIOPSY LYMPH NODES: CPT | Performed by: SURGERY

## 2017-03-03 NOTE — PROGRESS NOTES
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  They state that they have done well but still complains of redness, pain and swelling in the left groin.      Vital Signs   Temp:  [98.4 °F (36.9 °C)] 98.4 °F (36.9 °C)  BP: (200)/(100) 200/100    Physical Exam:     General Appearance:    Alert, cooperative, in no acute distress.  Persistent left inguinal adenopathy        Assessment / Plan : Persistent adenopathy.    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  I discussed with pathology and they recommended a core biopsy.  I explained this procedure to the patient.  He understood the procedure he also understood the risk of bleeding and infection relating to this.  I will we will also refer him to oncology and depending on the pathology a surgical oncologist for excision if sarcoma.      Teja Wilson MD  03/03/17

## 2017-03-10 DIAGNOSIS — C49.9 SARCOMA (HCC): Primary | ICD-10-CM

## 2017-03-14 ENCOUNTER — TELEPHONE (OUTPATIENT)
Dept: SURGERY | Facility: CLINIC | Age: 57
End: 2017-03-14

## 2017-03-14 DIAGNOSIS — C77.9 MALIGNANT MELANOMA METASTATIC TO LYMPH NODE (HCC): Primary | ICD-10-CM

## 2017-03-14 DIAGNOSIS — C43.9 MALIGNANT MELANOMA METASTATIC TO LYMPH NODE (HCC): Primary | ICD-10-CM

## 2017-03-14 DIAGNOSIS — C43.72 MALIGNANT MELANOMA OF LEFT LOWER EXTREMITY INCLUDING HIP (HCC): Primary | ICD-10-CM

## 2017-03-14 NOTE — TELEPHONE ENCOUNTER
----- Message from Sylvia Clemons sent at 3/14/2017 10:49 AM EDT -----  Patient sister Rosangela called saying pt would like to know path results . Please give her a call [phone].

## 2017-03-16 ENCOUNTER — OFFICE VISIT (OUTPATIENT)
Dept: SURGERY | Facility: CLINIC | Age: 57
End: 2017-03-16

## 2017-03-16 VITALS
TEMPERATURE: 98.1 F | WEIGHT: 190.6 LBS | HEIGHT: 69 IN | DIASTOLIC BLOOD PRESSURE: 92 MMHG | BODY MASS INDEX: 28.23 KG/M2 | SYSTOLIC BLOOD PRESSURE: 180 MMHG

## 2017-03-16 DIAGNOSIS — Z48.89 POSTOPERATIVE VISIT: Primary | ICD-10-CM

## 2017-03-16 PROCEDURE — 99024 POSTOP FOLLOW-UP VISIT: CPT | Performed by: SURGERY

## 2017-03-16 RX ORDER — LISINOPRIL 10 MG/1
10 TABLET ORAL DAILY
COMMUNITY
End: 2017-01-01 | Stop reason: SDUPTHER

## 2017-03-16 RX ORDER — ONDANSETRON 4 MG/1
4 TABLET, FILM COATED ORAL EVERY 8 HOURS PRN
COMMUNITY
End: 2017-04-05 | Stop reason: SDUPTHER

## 2017-03-16 RX ORDER — HYDROCODONE BITARTRATE AND ACETAMINOPHEN 5; 325 MG/1; MG/1
1 TABLET ORAL EVERY 6 HOURS PRN
Qty: 18 TABLET | Refills: 0 | Status: SHIPPED | OUTPATIENT
Start: 2017-03-16 | End: 2017-03-27 | Stop reason: ALTCHOICE

## 2017-03-16 NOTE — PROGRESS NOTES
"    Patient Care Team:  Andressa Chacko MD as PCP - General        No chief complaint on file.      Interval History:     History : Patient with pain in the left inguinal area at the surgery site.  He says at times it can be severe    Review of Systems   Constitutional: Positive for unexpected weight change. Negative for chills and fever.   HENT: Negative for trouble swallowing and voice change.    Eyes: Negative for visual disturbance.   Respiratory: Negative for apnea, cough, chest tightness, shortness of breath and wheezing.    Cardiovascular: Negative for chest pain, palpitations and leg swelling.   Gastrointestinal: Positive for nausea. Negative for abdominal distention, abdominal pain, anal bleeding, blood in stool, constipation, diarrhea, rectal pain and vomiting.   Endocrine: Negative for cold intolerance and heat intolerance.   Genitourinary: Negative for difficulty urinating, dysuria, flank pain, scrotal swelling and testicular pain.   Musculoskeletal: Negative for back pain, gait problem and joint swelling.   Skin: Positive for color change. Negative for rash and wound.   Neurological: Negative for dizziness, syncope, speech difficulty, weakness, numbness and headaches.   Hematological: Positive for adenopathy. Does not bruise/bleed easily.   Psychiatric/Behavioral: Negative for confusion. The patient is not nervous/anxious.        Vital Signs  Visit Vitals   • /92 (BP Location: Right arm)   • Temp 98.1 °F (36.7 °C) (Temporal Artery )   • Ht 69\" (175.3 cm)   • Wt 190 lb 9.6 oz (86.5 kg)   • BMI 28.15 kg/m2       Physical Exam:     General Appearance:    Alert, cooperative, in no acute distress       Skin:   patient has some swelling at the lymph node excision site.  I am suspicious of further melanoma involvement there as well causing him pain.  He had this prior to surgery also.          Assessment/Plan : Postop.  Otherwise stable.  Planning PET scan and oncology referral.  Ultimately he " likely will be referred for definitive surgical therapy if indicated and that will depend on distant metastases.    No diagnosis found.     There are no diagnoses linked to this encounter.              Teja Wilson MD  03/16/17

## 2017-03-20 ENCOUNTER — TELEPHONE (OUTPATIENT)
Dept: SURGERY | Facility: CLINIC | Age: 57
End: 2017-03-20

## 2017-03-20 NOTE — TELEPHONE ENCOUNTER
----- Message from Sylvia Clemons sent at 3/20/2017  2:26 PM EDT -----  Contact: [phone]  Patient Rosangela roy called saying pt didn't have PET Scan today due to sugar being high. Was told to call office to get rescheduled.Please give pt a call.

## 2017-03-20 NOTE — TELEPHONE ENCOUNTER
Pt's sister was asked to have Mr Arroyo see his primary doctor to get sugar level lined out. They will call to reschedule pet scan once this is done. Jessy @ Dr Chacko's office is also aware and will call patient to set up appointment. Thanks.

## 2017-03-27 ENCOUNTER — CONSULT (OUTPATIENT)
Dept: ONCOLOGY | Facility: CLINIC | Age: 57
End: 2017-03-27

## 2017-03-27 VITALS
WEIGHT: 192 LBS | SYSTOLIC BLOOD PRESSURE: 198 MMHG | HEART RATE: 116 BPM | HEIGHT: 67 IN | BODY MASS INDEX: 30.13 KG/M2 | RESPIRATION RATE: 16 BRPM | TEMPERATURE: 97.8 F | DIASTOLIC BLOOD PRESSURE: 98 MMHG

## 2017-03-27 DIAGNOSIS — C43.9 MALIGNANT MELANOMA, UNSPECIFIED SITE (HCC): Primary | ICD-10-CM

## 2017-03-27 PROCEDURE — 99205 OFFICE O/P NEW HI 60 MIN: CPT | Performed by: INTERNAL MEDICINE

## 2017-03-27 RX ORDER — PROMETHAZINE HYDROCHLORIDE 25 MG/1
25 TABLET ORAL EVERY 6 HOURS PRN
COMMUNITY
End: 2017-05-01

## 2017-03-27 RX ORDER — OXYCODONE HYDROCHLORIDE AND ACETAMINOPHEN 5; 325 MG/1; MG/1
1 TABLET ORAL EVERY 6 HOURS PRN
Qty: 120 TABLET | Refills: 0 | Status: SHIPPED | OUTPATIENT
Start: 2017-03-27 | End: 2017-04-24 | Stop reason: SDUPTHER

## 2017-03-27 NOTE — PROGRESS NOTES
DATE OF CONSULTATION: 3/27/2017    REFERRING PHYSICIAN: Andressa Chacko MD    Dear Dr. Andressa Chacko MD  Thank you for asking for my medical advice on this patient. I saw him in the  [place] office on 3/27/2017    REASON FOR CONSULTATION: Metastatic melanoma with spindle cell features     HISTORY OF PRESENT ILLNESS: The patient is a very pleasant 56 y.o.  male   who was in his usual state of health until approximately 2 months. The patient presented with mass in his left groin, this has been gradually increasing in size.  Patient never had this problem before.  This is associated with mild pain get worse with activity and improves with laying flat.  Patient went and so his primary care provider who referred him to see his surgeon.  Patient so Dr. Wilson on February 28, 2017.  Patient had a biopsy done March 3, 2017 that revealed metastatic melanoma.  Patient was referred to me for further recommendations.    SUBJECTIVE: When I saw the patient today he is doing fairly well.  He had lost 30 pounds over the last 6 month.  Patient has been diagnosed with melanoma 13 years ago with a lesion removed from his left check.  Patient also had another melanoma lesion removed from his lower lip 12 years ago.  The patient his left groin has been getting gradually worse he has been on Norco 5/325 mg without much relief.  This was prescribed by his surgeon.    Review of Systems   Constitutional: Positive for fatigue. Negative for activity change, appetite change, chills, fever and unexpected weight change.   HENT: Negative for hearing loss, mouth sores, nosebleeds, sore throat and trouble swallowing.    Eyes: Negative for visual disturbance.   Respiratory: Negative for cough, chest tightness, shortness of breath and wheezing.    Cardiovascular: Negative for chest pain, palpitations and leg swelling.   Gastrointestinal: Negative for abdominal distention, abdominal pain, blood in stool, constipation, diarrhea,  nausea, rectal pain and vomiting.   Endocrine: Negative for cold intolerance and heat intolerance.   Genitourinary: Negative for difficulty urinating, dysuria, frequency and urgency.   Musculoskeletal: Negative for arthralgias, back pain, gait problem, joint swelling and myalgias.   Skin: Negative for rash.   Neurological: Negative for dizziness, tremors, syncope, weakness, light-headedness, numbness and headaches.   Hematological: Negative for adenopathy. Does not bruise/bleed easily.   Psychiatric/Behavioral: Negative for confusion, sleep disturbance and suicidal ideas. The patient is not nervous/anxious.        Past Medical History:   Diagnosis Date   • Arthritis    • Cancer     skin/face   • Diabetes mellitus    • Gout    • Hypertension        Social History     Social History   • Marital status: Single     Spouse name: N/A   • Number of children: N/A   • Years of education: N/A     Occupational History   • Not on file.     Social History Main Topics   • Smoking status: Never Smoker   • Smokeless tobacco: Never Used   • Alcohol use Yes      Comment: 5-6 nightly   • Drug use: No   • Sexual activity: Defer     Other Topics Concern   • Not on file     Social History Narrative       Family History   Problem Relation Age of Onset   • Skin cancer Mother    • Asthma Mother    • Other Mother       of sepsis   • Heart disease Mother      enlarged heart   • Lung cancer Father    • Throat cancer Father    • COPD Father    • Melanoma Sister    • Diabetes Brother    • Liver cancer Brother    • Diabetes Brother    • Diabetes Brother    • Breast cancer Maternal Aunt        Past Surgical History:   Procedure Laterality Date   • LIP RECONSTRUCTION     • SKIN CANCER EXCISION      face-; lip-   • SOFT TISSUE BIOPSY      groin       No Known Allergies       Current Outpatient Prescriptions:   •  promethazine (PHENERGAN) 25 MG tablet, Take 25 mg by mouth Every 6 (Six) Hours As Needed for Nausea or Vomiting., Disp: , Rfl:  "  •  lisinopril (PRINIVIL,ZESTRIL) 10 MG tablet, Take 10 mg by mouth Daily., Disp: , Rfl:   •  losartan (COZAAR) 50 MG tablet, Take 50 mg by mouth Daily., Disp: , Rfl:   •  ondansetron (ZOFRAN) 4 MG tablet, Take 4 mg by mouth Every 8 (Eight) Hours As Needed for Nausea or Vomiting., Disp: , Rfl:   •  oxyCODONE-acetaminophen (PERCOCET) 5-325 MG per tablet, Take 1 tablet by mouth Every 6 (Six) Hours As Needed for Moderate Pain (4-6)., Disp: 120 tablet, Rfl: 0    PHYSICAL EXAMINATION:   BP (!) 198/98  Pulse 116  Temp 97.8 °F (36.6 °C) (Temporal Artery )   Resp 16  Ht 67\" (170.2 cm)  Wt 192 lb (87.1 kg)  BMI 30.07 kg/m2   ECOG Performance Status: 1 - Symptomatic but completely ambulatory  General Appearance:  alert, cooperative, no apparent distress and appears stated age   Neurologic/Psychiatric: A&O x 3, gait steady, appropriate affect, strength 5/5 in all muscle groups   HEENT:  Normocephalic, without obvious abnormality, mucous membranes moist   Neck: Supple, symmetrical, trachea midline, no adenopathy;  No thyromegaly, masses, or tenderness   Lungs:   Clear to auscultation bilaterally; respirations regular, even, and unlabored bilaterally   Heart:  Regular rate and rhythm, no murmurs appreciated   Abdomen:   Soft, non-tender, non-distended and no organomegaly   Lymph nodes: No cervical, supraclavicular, inguinal or axillary adenopathy noted   Extremities: Normal, atraumatic; no clubbing, cyanosis, or edema    Skin: No rashes, ulcers, or suspicious lesions noted       No visits with results within 2 Week(s) from this visit.  Latest known visit with results is:    Admission on 02/12/2017, Discharged on 02/12/2017   Component Date Value Ref Range Status   • Glucose 02/12/2017 276* 74 - 98 mg/dL Final    Glucose >180, Hemoglobin A1C recommended.   • BUN 02/12/2017 5* 7 - 20 mg/dL Final   • Creatinine 02/12/2017 0.50* 0.60 - 1.30 mg/dL Final   • Sodium 02/12/2017 139  137 - 145 mmol/L Final   • Potassium 02/12/2017 " 4.2  3.5 - 5.1 mmol/L Final   • Chloride 02/12/2017 99  98 - 107 mmol/L Final   • CO2 02/12/2017 27.0  26.0 - 30.0 mmol/L Final   • Calcium 02/12/2017 9.1  8.4 - 10.2 mg/dL Final   • Total Protein 02/12/2017 8.0  6.3 - 8.2 g/dL Final   • Albumin 02/12/2017 4.20  3.50 - 5.00 g/dL Final   • ALT (SGPT) 02/12/2017 24  13 - 69 U/L Final   • AST (SGOT) 02/12/2017 47* 15 - 46 U/L Final   • Alkaline Phosphatase 02/12/2017 127* 38 - 126 U/L Final   • Total Bilirubin 02/12/2017 1.1  0.2 - 1.3 mg/dL Final   • eGFR Non African Amer 02/12/2017 >150  >60 mL/min/1.73 Final   • Globulin 02/12/2017 3.8  gm/dL Final   • A/G Ratio 02/12/2017 1.1  1.0 - 2.0 g/dL Final   • BUN/Creatinine Ratio 02/12/2017 10.0  6.3 - 21.9 Final   • Anion Gap 02/12/2017 17.2  mmol/L Final   • Lipase 02/12/2017 67  23 - 300 U/L Final   • THC, Screen, Urine 02/12/2017 Negative  Negative Final   • Phencyclidine (PCP), Urine 02/12/2017 Negative  Negative Final   • Cocaine Screen, Urine 02/12/2017 Negative  Negative Final   • Methamphetamine, Urine 02/12/2017 Negative  Negative Final   • Opiate Screen 02/12/2017 Negative  Negative Final   • Amphetamine Screen, Urine 02/12/2017 Negative  Negative Final   • Benzodiazepine Screen, Urine 02/12/2017 Negative  Negative Final   • Tricyclic Antidepressants Screen 02/12/2017 Negative  Negative Final   • Methadone Screen, Urine 02/12/2017 Negative  Negative Final   • Barbiturates Screen, Urine 02/12/2017 Negative  Negative Final   • Oxycodone Screen, Urine 02/12/2017 Negative  Negative Final   • Propoxyphene Screen 02/12/2017 Negative  Negative Final   • Buprenorphine, Screen, Urine 02/12/2017 Negative  Negative Final   • Ethanol 02/12/2017 233* <=10 mg/dL Final   • Ethanol % 02/12/2017 0.233  % Final   • WBC 02/12/2017 6.20  4.80 - 10.80 10*3/mm3 Final   • RBC 02/12/2017 5.72  4.70 - 6.10 10*6/mm3 Final   • Hemoglobin 02/12/2017 18.4* 14.0 - 18.0 g/dL Final   • Hematocrit 02/12/2017 51.5  42.0 - 52.0 % Final   • MCV  02/12/2017 90.0  80.0 - 94.0 fL Final   • MCH 02/12/2017 32.2* 27.0 - 31.0 pg Final   • MCHC 02/12/2017 35.7  30.0 - 37.0 g/dL Final   • RDW 02/12/2017 12.6  11.5 - 14.5 % Final   • RDW-SD 02/12/2017 41.3  37.0 - 54.0 fl Final   • MPV 02/12/2017 10.5  6.0 - 12.0 fL Final   • Platelets 02/12/2017 135  130 - 400 10*3/mm3 Final   • Neutrophil % 02/12/2017 59.3  37.0 - 80.0 % Final   • Lymphocyte % 02/12/2017 26.9  10.0 - 50.0 % Final   • Monocyte % 02/12/2017 10.6  0.0 - 12.0 % Final   • Eosinophil % 02/12/2017 2.3  0.0 - 7.0 % Final   • Basophil % 02/12/2017 0.6  0.0 - 2.5 % Final   • Immature Grans % 02/12/2017 0.3  0.0 - 0.6 % Final   • Neutrophils, Absolute 02/12/2017 3.67  2.00 - 6.90 10*3/mm3 Final   • Lymphocytes, Absolute 02/12/2017 1.67  0.60 - 3.40 10*3/mm3 Final   • Monocytes, Absolute 02/12/2017 0.66  0.00 - 0.90 10*3/mm3 Final   • Eosinophils, Absolute 02/12/2017 0.14  0.00 - 0.70 10*3/mm3 Final   • Basophils, Absolute 02/12/2017 0.04  0.00 - 0.20 10*3/mm3 Final   • Immature Grans, Absolute 02/12/2017 0.02  0.00 - 0.06 10*3/mm3 Final   • nRBC 02/12/2017 0.0  0.0 - 0.0 /100 WBC Final   • Troponin I 02/12/2017 0.034  0.000 - 0.034 ng/mL Final        Us Gallbladder    Result Date: 2/28/2017  Narrative: Ultrasound of the gallbladder performed. No gallstones noted. Gallbladder wall measured 3mm and was normal.  CBD measured 3mm and was normal. No evidence of cholecystitis. Liver appeared normal.      Us Guided Fine Needle Aspiration    Result Date: 2/28/2017  Narrative: Plan fine needle aspiration for left inguinal enlarged lymph node.  The area was prepped in the usual fashion.  1% lidocaine was used locally.  An 18-gauge needle was used to perform multiple passes under ultrasound guidance with excellent positioning of the needle within the lymph node.  Patient tolerated the procedure well and there were no complications.    Us Guided Lymph Node Biopsy Left    Result Date: 3/3/2017  Narrative: Persistent left  inguinal adenopathy is noted by ultrasound.  Core biopsy needle is in excellent position within this lymph node.  For The description of the procedure please see the office note.    Us Soft Tissue    Result Date: 2/28/2017  Narrative: Ultrasound of the left groin reveals an enlarged lymph node measuring 4.5 cm in greatest diameter corresponding to the palpable abnormality.  Increased vascularity is noted.  It appears homogeneous.  Adjacent 1.5 cm lymph node is also noted.  Recommend further workup with biopsy possibly FNA.        DIAGNOSTIC DATA:   1. Radiology:   CT Abdomen Pelvis With Contrast [[id number]] (Order [id number])   Status: Final result   PACS Images   Radiology Images   Study Result   FINAL REPORT     TECHNIQUE:  Multiple contiguous transaxial slices through the abdomen and pelvis were obtained after the administration of contrast.     CLINICAL HISTORY:  FALL GROIN PAIN     FINDINGS:  There is bilateral lower lobe atelectasis. The liver, gallbladder, spleen, pancreas, adrenal glands and kidneys are unremarkable. The bowel gas pattern is nonobstructive. There is no wall thickening or mesenteric inflammatory stranding. The bladder is   normal in contour.     There are multiple centrally necrotic enlarged left inguinal, obturator and iliac chain lymph nodes measuring up to 3 cm in the left iliac region, incompletely imaged. Findings may be infectious, inflammatory or neoplastic. There is no free fluid or free  air. No acute fracture is seen.     Impression:     Left inguinal and iliac chain adenopathy. Recommend appropriate tissue diagnosis and clinical followup     IMPRESSION:  Authenticated by Beth Montoya MD on 02/12/2017 06:57:20 PM     2. Dr. Ragsdale's note from March 2, 2017 reviewed by me and documented in the  chart.   3. Pathology report: March 2, 2017 left inguinal lymph node biopsy showed metastatic melanoma with spindle cell features.  4. Laboratory data:   Results for NEHEMIAH ELIZABETH (MRN  [id number]) as of 3/27/2017 15:18   Ref. Range 2/12/2017 17:28   WBC Latest Ref Range: 4.80 - 10.80 10*3/mm3 6.20   RBC Latest Ref Range: 4.70 - 6.10 10*6/mm3 5.72   Hemoglobin Latest Ref Range: 14.0 - 18.0 g/dL 18.4 (C)   Hematocrit Latest Ref Range: 42.0 - 52.0 % 51.5   RDW Latest Ref Range: 11.5 - 14.5 % 12.6   MCV Latest Ref Range: 80.0 - 94.0 fL 90.0   MCH Latest Ref Range: 27.0 - 31.0 pg 32.2 (H)   MCHC Latest Ref Range: 30.0 - 37.0 g/dL 35.7   MPV Latest Ref Range: 6.0 - 12.0 fL 10.5   Platelets Latest Ref Range: 130 - 400 10*3/mm3 135        ASSESSMENT: The patient is a very pleasant 56 y.o.  male  with metastatic melanoma    PROBLEM LIST:   1.  Metastatic melanoma diagnosed after ultrasound-guided biopsy left inguinal lymph node March 2, 2017.  2.  Remote history of melanoma left check and left lower lip 12 and 13 years ago.  3.  Uncontrolled type 2 diabetes  4.  Cancer related pain  5.  Alcohol abuse    PLAN:   1. I had a long discussion today with the patient and his Brother about his  new diagnosis of metastatic melanoma. I did go over the final pathology report in  detail with both of them. I reviewed the patient's documents including refereing provider's notes, lab results, imaging, and path report.   2.  I reviewed the patient's films myself.  I discussed the case with Dr. Wilson to coordinate patient's care.  3.  The patient needs to have staging workup which would include MRI brain as well as only PET scan. I will go ahead and order both to be done at [facility].  4.  Patient will need to have his sugar is less than 200 or the good the PET scan done.  Currently he is on metformin 1000 mg daily.  He may need to be on insulin versus another hypo-glycemic agent.  5.  I will start the patient on Percocet 5/325 mg every 6 hours as needed for cancer-related pain.  6.  If patient has localized disease in the left groin he will be treated with lymph node dissection with or without involved  field radiation and watchful waiting on the other hand if he has evidence of distant metastatic disease he will be treated with systemic treatment using Opdivo Yervoy.  7.  Patient will follow-up with me in [place] office to go over the results.    Kavitha Martins MD  3/27/2017

## 2017-04-03 ENCOUNTER — APPOINTMENT (OUTPATIENT)
Dept: PET IMAGING | Facility: HOSPITAL | Age: 57
End: 2017-04-03
Attending: INTERNAL MEDICINE

## 2017-04-03 ENCOUNTER — APPOINTMENT (OUTPATIENT)
Dept: MRI IMAGING | Facility: HOSPITAL | Age: 57
End: 2017-04-03
Attending: INTERNAL MEDICINE

## 2017-04-05 ENCOUNTER — OFFICE VISIT (OUTPATIENT)
Dept: ONCOLOGY | Facility: CLINIC | Age: 57
End: 2017-04-05

## 2017-04-05 VITALS
HEIGHT: 67 IN | WEIGHT: 193.4 LBS | DIASTOLIC BLOOD PRESSURE: 110 MMHG | RESPIRATION RATE: 20 BRPM | HEART RATE: 115 BPM | BODY MASS INDEX: 30.35 KG/M2 | TEMPERATURE: 98.8 F | SYSTOLIC BLOOD PRESSURE: 210 MMHG

## 2017-04-05 DIAGNOSIS — C77.9 METASTATIC MELANOMA TO LYMPH NODE (HCC): Primary | ICD-10-CM

## 2017-04-05 PROCEDURE — 99215 OFFICE O/P EST HI 40 MIN: CPT | Performed by: INTERNAL MEDICINE

## 2017-04-05 RX ORDER — METOCLOPRAMIDE 5 MG/1
5 TABLET ORAL 3 TIMES DAILY
Qty: 90 TABLET | Refills: 5 | Status: SHIPPED | OUTPATIENT
Start: 2017-04-05 | End: 2017-07-05

## 2017-04-05 RX ORDER — ONDANSETRON HYDROCHLORIDE 8 MG/1
8 TABLET, FILM COATED ORAL 3 TIMES DAILY PRN
Qty: 30 TABLET | Refills: 5 | Status: SHIPPED | OUTPATIENT
Start: 2017-04-05 | End: 2017-01-01 | Stop reason: SDUPTHER

## 2017-04-05 RX ORDER — ONDANSETRON HYDROCHLORIDE 8 MG/1
8 TABLET, FILM COATED ORAL 3 TIMES DAILY PRN
Qty: 30 TABLET | Refills: 5 | Status: CANCELLED | OUTPATIENT
Start: 2017-04-11

## 2017-04-05 NOTE — PROGRESS NOTES
DATE OF VISIT: 4/5/2017    REASON FOR VISIT: Followup for Metastatic melanoma with spindle cell features      HISTORY OF PRESENT ILLNESS: The patient is a very pleasant 56 y.o. male  who was in his usual state of health until approximately February 2017. The patient presented with mass in his left groin, this has been gradually increasing in size. Patient never had this problem before. This is associated with mild pain get worse with activity and improves with laying flat. Patient went and so his primary care provider who referred him to see his surgeon. Patient so Dr. Wilson on February 28, 2017. Patient had a biopsy done March 3, 2017 that revealed metastatic melanoma.  The patient had CAT scan abdomen and pelvis done February 12, 2017 revealed left inguinal and iliac chain adenopathy.  Unfortunately insurance had declined whole body PET scan as well as MRI brain for staging purposes and there was no option.  The  patient is here today for scheduled follow-up visit.    SUBJECTIVE: The patient has been doing fairly well. he has been complaining of nausea every time he tries to eat Zofran is not helping. he denied any fever or  chills, no night sweats, denied any headaches    PAST MEDICAL HISTORY/SOCIAL HISTORY/FAMILY HISTORY: Unchanged from my prior documentation done on March 27 2017    Review of Systems   Constitutional: Negative for activity change, appetite change, chills, fatigue, fever and unexpected weight change.   HENT: Negative for hearing loss, mouth sores, nosebleeds, sore throat and trouble swallowing.    Eyes: Negative for visual disturbance.   Respiratory: Negative for cough, chest tightness, shortness of breath and wheezing.    Cardiovascular: Negative for chest pain, palpitations and leg swelling.   Gastrointestinal: Negative for abdominal distention, abdominal pain, blood in stool, constipation, diarrhea, nausea, rectal pain and vomiting.   Endocrine: Negative for cold intolerance and heat  "intolerance.   Genitourinary: Negative for difficulty urinating, dysuria, frequency and urgency.   Musculoskeletal: Negative for arthralgias, back pain, gait problem, joint swelling and myalgias.   Skin: Negative for rash.   Neurological: Negative for dizziness, tremors, syncope, weakness, light-headedness, numbness and headaches.   Hematological: Negative for adenopathy. Does not bruise/bleed easily.   Psychiatric/Behavioral: Negative for confusion, sleep disturbance and suicidal ideas. The patient is not nervous/anxious.          Current Outpatient Prescriptions:   •  lisinopril (PRINIVIL,ZESTRIL) 10 MG tablet, Take 10 mg by mouth Daily., Disp: , Rfl:   •  losartan (COZAAR) 50 MG tablet, Take 50 mg by mouth Daily., Disp: , Rfl:   •  metFORMIN (GLUCOPHAGE) 500 MG tablet, Take 500 mg by mouth 2 (Two) Times a Day With Meals., Disp: , Rfl:   •  ondansetron (ZOFRAN) 4 MG tablet, Take 4 mg by mouth Every 8 (Eight) Hours As Needed for Nausea or Vomiting., Disp: , Rfl:   •  oxyCODONE-acetaminophen (PERCOCET) 5-325 MG per tablet, Take 1 tablet by mouth Every 6 (Six) Hours As Needed for Moderate Pain (4-6)., Disp: 120 tablet, Rfl: 0  •  promethazine (PHENERGAN) 25 MG tablet, Take 25 mg by mouth Every 6 (Six) Hours As Needed for Nausea or Vomiting., Disp: , Rfl:     PHYSICAL EXAMINATION:   BP (!) 210/110 Comment: pt denies dizziness, headache.  Pulse 115  Temp 98.8 °F (37.1 °C) (Temporal Artery )   Resp 20  Ht 67\" (170.2 cm)  Wt 191 lb 3.2 oz (86.7 kg)  BMI 29.95 kg/m2   ECOG Performance Status: 0 - Asymptomatic  General Appearance:  alert, cooperative, no apparent distress and appears stated age   Neurologic/Psychiatric: A&O x 3, gait steady, appropriate affect, strength 5/5 in all muscle groups   HEENT:  Normocephalic, without obvious abnormality, mucous membranes moist   Neck: Supple, symmetrical, trachea midline, no adenopathy;  No thyromegaly, masses, or tenderness   Lungs:   Clear to auscultation bilaterally; " respirations regular, even, and unlabored bilaterally   Heart:  Regular rate and rhythm, no murmurs appreciated   Abdomen:   Soft, non-tender, non-distended and no organomegaly   Lymph nodes: No cervical, supraclavicular, inguinal or axillary adenopathy noted   Extremities: Normal, atraumatic; no clubbing, cyanosis, or edema    Skin: No rashes, ulcers, or suspicious lesions noted     No visits with results within 2 Week(s) from this visit.  Latest known visit with results is:    Admission on 02/12/2017, Discharged on 02/12/2017   Component Date Value Ref Range Status   • Glucose 02/12/2017 276* 74 - 98 mg/dL Final    Glucose >180, Hemoglobin A1C recommended.   • BUN 02/12/2017 5* 7 - 20 mg/dL Final   • Creatinine 02/12/2017 0.50* 0.60 - 1.30 mg/dL Final   • Sodium 02/12/2017 139  137 - 145 mmol/L Final   • Potassium 02/12/2017 4.2  3.5 - 5.1 mmol/L Final   • Chloride 02/12/2017 99  98 - 107 mmol/L Final   • CO2 02/12/2017 27.0  26.0 - 30.0 mmol/L Final   • Calcium 02/12/2017 9.1  8.4 - 10.2 mg/dL Final   • Total Protein 02/12/2017 8.0  6.3 - 8.2 g/dL Final   • Albumin 02/12/2017 4.20  3.50 - 5.00 g/dL Final   • ALT (SGPT) 02/12/2017 24  13 - 69 U/L Final   • AST (SGOT) 02/12/2017 47* 15 - 46 U/L Final   • Alkaline Phosphatase 02/12/2017 127* 38 - 126 U/L Final   • Total Bilirubin 02/12/2017 1.1  0.2 - 1.3 mg/dL Final   • eGFR Non African Amer 02/12/2017 >150  >60 mL/min/1.73 Final   • Globulin 02/12/2017 3.8  gm/dL Final   • A/G Ratio 02/12/2017 1.1  1.0 - 2.0 g/dL Final   • BUN/Creatinine Ratio 02/12/2017 10.0  6.3 - 21.9 Final   • Anion Gap 02/12/2017 17.2  mmol/L Final   • Lipase 02/12/2017 67  23 - 300 U/L Final   • THC, Screen, Urine 02/12/2017 Negative  Negative Final   • Phencyclidine (PCP), Urine 02/12/2017 Negative  Negative Final   • Cocaine Screen, Urine 02/12/2017 Negative  Negative Final   • Methamphetamine, Urine 02/12/2017 Negative  Negative Final   • Opiate Screen 02/12/2017 Negative  Negative Final    • Amphetamine Screen, Urine 02/12/2017 Negative  Negative Final   • Benzodiazepine Screen, Urine 02/12/2017 Negative  Negative Final   • Tricyclic Antidepressants Screen 02/12/2017 Negative  Negative Final   • Methadone Screen, Urine 02/12/2017 Negative  Negative Final   • Barbiturates Screen, Urine 02/12/2017 Negative  Negative Final   • Oxycodone Screen, Urine 02/12/2017 Negative  Negative Final   • Propoxyphene Screen 02/12/2017 Negative  Negative Final   • Buprenorphine, Screen, Urine 02/12/2017 Negative  Negative Final   • Ethanol 02/12/2017 233* <=10 mg/dL Final   • Ethanol % 02/12/2017 0.233  % Final   • WBC 02/12/2017 6.20  4.80 - 10.80 10*3/mm3 Final   • RBC 02/12/2017 5.72  4.70 - 6.10 10*6/mm3 Final   • Hemoglobin 02/12/2017 18.4* 14.0 - 18.0 g/dL Final   • Hematocrit 02/12/2017 51.5  42.0 - 52.0 % Final   • MCV 02/12/2017 90.0  80.0 - 94.0 fL Final   • MCH 02/12/2017 32.2* 27.0 - 31.0 pg Final   • MCHC 02/12/2017 35.7  30.0 - 37.0 g/dL Final   • RDW 02/12/2017 12.6  11.5 - 14.5 % Final   • RDW-SD 02/12/2017 41.3  37.0 - 54.0 fl Final   • MPV 02/12/2017 10.5  6.0 - 12.0 fL Final   • Platelets 02/12/2017 135  130 - 400 10*3/mm3 Final   • Neutrophil % 02/12/2017 59.3  37.0 - 80.0 % Final   • Lymphocyte % 02/12/2017 26.9  10.0 - 50.0 % Final   • Monocyte % 02/12/2017 10.6  0.0 - 12.0 % Final   • Eosinophil % 02/12/2017 2.3  0.0 - 7.0 % Final   • Basophil % 02/12/2017 0.6  0.0 - 2.5 % Final   • Immature Grans % 02/12/2017 0.3  0.0 - 0.6 % Final   • Neutrophils, Absolute 02/12/2017 3.67  2.00 - 6.90 10*3/mm3 Final   • Lymphocytes, Absolute 02/12/2017 1.67  0.60 - 3.40 10*3/mm3 Final   • Monocytes, Absolute 02/12/2017 0.66  0.00 - 0.90 10*3/mm3 Final   • Eosinophils, Absolute 02/12/2017 0.14  0.00 - 0.70 10*3/mm3 Final   • Basophils, Absolute 02/12/2017 0.04  0.00 - 0.20 10*3/mm3 Final   • Immature Grans, Absolute 02/12/2017 0.02  0.00 - 0.06 10*3/mm3 Final   • nRBC 02/12/2017 0.0  0.0 - 0.0 /100 WBC Final    • Troponin I 02/12/2017 0.034  0.000 - 0.034 ng/mL Final        No results found.    ASSESSMENT: The patient is a very pleasant 56 y.o. male with metastatic melanoma     PROBLEM LIST:   1. Metastatic melanoma diagnosed after ultrasound-guided biopsy left inguinal lymph node March 2, 2017.  TX NX M1 B.  Disease burden left inguinal and left iliac chain lymphadenopathy.  2. Remote history of melanoma left check and left lower lip 12 and 13 years ago.  3. Uncontrolled type 2 diabetes  4. Cancer related pain  5.  Alcohol abuse    PLAN:  1.  Unfortunately patient insurance had denied whole body PET scan as well as MRI brain for staging melanoma of unknown primary.  There was no option for me to do [id number] and we had to wait for 30 days for the appeal and 45 days before I can resubmit and you order. Obviously patient does not have the luxury time to wait given his metastatic disease with melanoma including aggressive features noted on the pathology report.  2.  The patient will be treated with Opdivo plus Yervoy for metastatic melanoma 4 cycles followed by Opdivo maintenance.  3. We discussed potential side effects of immunotherapy including but not limited to immune mediated reactions with thyroiditis, pneumonitis, hepatitis, colitis, rash, and electrolytes abnormalities, fatigue, multiorgan failure, and possibly death.  4.  I will go and order CT chest pressure patient does not have thoracic metastatic disease.  5.  Patient restarted treatment next week.  He will follow-up with me in 4 weeks for cycle #2.  6.  I will continue Percocet 5/325 mg 6 hours as needed for cancer-related pain.  7.  I'll be monitoring patient blood counts kidney function liver function as well as thyroid function.  8.  I will start the patient on Reglan 5 mg 3 times a day as well as Zofran 8 mg every 6 hours as needed for nausea.  9.  Patient will follow-up with Dr. Chacko for uncontrolled type 2 diabetes.    Kavitha Martins  MD  4/5/2017

## 2017-04-06 ENCOUNTER — EDUCATION (OUTPATIENT)
Dept: ONCOLOGY | Facility: CLINIC | Age: 57
End: 2017-04-06

## 2017-04-06 NOTE — PLAN OF CARE
Outpatient Infusion • [address] • [address] • [place] [id number] • [phone]      CHEMOTHERAPY EDUCATION SHEET    NAME:  Lennie Arroyo      : 1960           DATE: 17    fgBooklets Given: Chemotherapy and You [x]  Eating Hints [x]    Sexuality/Fertility Books [x]     Chemotherapy/Biotherapy Education Sheets: (list all that apply)  Nivolumab/ Ipilimumab                                                                                                                                                                Chemotherapy Regimen:  Nivolumab + Ipilimumab q 21 days    TOPICS EDUCATION PROVIDED EDUCATION REINFORCED COMMENTS   ANEMIA:  role of RBC, cause, s/s, ways to manage, role of transfusion [] []    THROMBOCYTOPENIA:  role of platelet, cause, s/s, ways to prevent bleeding, things to avoid, when to seek help [] []    NEUTROPENIA:  role of WBC, cause, infection precautions, s/s of infection, when to call MD [x] [] Discussed risk of low WBC and increased risk of infection. Discussed importance of hand washing and contacting office if were to develop a temp of 100.4F or higher.   NUTRITION & APPETITE CHANGES:  importance of maintaining healthy diet & weight, ways to manage to improve intake, dietary consult, exercise regimen [] []    DIARRHEA:  causes, s/s of dehydration, ways to manage, dietary changes, when to call MD [x] [] Discussed risk of diarrhea and contacting office if were to develop.   CONSTIPATION:  causes, ways to manage, dietary changes, when to call MD [] []    NAUSEA & VOMITING:  cause, use of antiemetics, dietary changes, when to call MD [x] [] Discussed risk of nausea and role of Ondansetron and Metoclopramide at home.   MOUTH SORES:  causes, oral care, ways to manage [] []    ALOPECIA:  cause, ways to manage, resources [] []    INFERTILITY & SEXUALITY:  causes, fertility preservation options, sexuality changes, ways to manage, importance of birth control [] []     NERVOUS SYSTEM CHANGES:  causes, s/s, neuropathies, cognitive changes, ways to manage [] []    PAIN:  causes, ways to manage [] [] ????   SKIN & NAIL CHANGES:  cause, s/s, ways to manage [] []    ORGAN TOXICITIES:  cause, s/s, need for diagnostic tests, labs, when to notify MD [x] [] Discussed risk of rash and contacting office if were to develop. Also discussed immune mediated pneumonitis, colitis, hepatitis, and nephritis. Discussed monitoring of thyroid function at visits and potential for hypo/hyper thyroidism. Discussed electrolyte abnormalities and monitoring.   SURVIVORSHIP:  distress, distress assessment, secondary malignancies, early/late effects, follow-up, social issues, social support [] []    HOME CARE:  use of spill kits, storing of PO chemo, how to manage bodily fluids [] []    MISCELLANEOUS:  drug interactions, administration, vesicant, et [x] [] Discussed infusion duration and frequency.      Referrals:        Notes:   Spoke with pt's sisterRosangela. Mailing education sheets and IV chemo starter folder to residence. Advised pt's sister to contact office with any questions/concerns.      Otoniel Esparza, PharmD Candidate  04/06/2017

## 2017-04-10 ENCOUNTER — TELEPHONE (OUTPATIENT)
Dept: ONCOLOGY | Facility: CLINIC | Age: 57
End: 2017-04-10

## 2017-04-10 NOTE — TELEPHONE ENCOUNTER
----- Message from Leia Valente sent at 4/10/2017  9:01 AM EDT -----  Regarding: JOZEF - QUESTION REGARDING SCANS  Contact: [phone]  Rosangela, sister of patient called and said that they received a letter from [facility] stating they approved the PET SCAN and the MRI. He is currently scheduled for a CT SCAN on Thursday.     Should he still go for the CT SCAN or wait to get PET / MRI scheduled since they received a letter saying they are approved?

## 2017-04-10 NOTE — TELEPHONE ENCOUNTER
Per Auth, PET and MRI did get approved. Advised will do those instead of the CT scans, and will have scheduling call with appointment information

## 2017-04-12 ENCOUNTER — INFUSION (OUTPATIENT)
Dept: ONCOLOGY | Facility: HOSPITAL | Age: 57
End: 2017-04-12

## 2017-04-12 VITALS
TEMPERATURE: 98.6 F | RESPIRATION RATE: 18 BRPM | BODY MASS INDEX: 30.07 KG/M2 | HEART RATE: 111 BPM | WEIGHT: 192 LBS | SYSTOLIC BLOOD PRESSURE: 217 MMHG | DIASTOLIC BLOOD PRESSURE: 100 MMHG

## 2017-04-12 DIAGNOSIS — C77.9 METASTATIC MELANOMA TO LYMPH NODE (HCC): Primary | ICD-10-CM

## 2017-04-12 DIAGNOSIS — C77.9 METASTATIC MELANOMA TO LYMPH NODE (HCC): ICD-10-CM

## 2017-04-12 LAB
ALBUMIN SERPL-MCNC: 4.2 G/DL (ref 3.5–5)
ALBUMIN/GLOB SERPL: 1.1 G/DL (ref 1–2)
ALP SERPL-CCNC: 154 U/L (ref 38–126)
ALT SERPL W P-5'-P-CCNC: 29 U/L (ref 13–69)
ANION GAP SERPL CALCULATED.3IONS-SCNC: 12.2 MMOL/L
AST SERPL-CCNC: 30 U/L (ref 15–46)
BASOPHILS # BLD AUTO: 0.04 10*3/MM3 (ref 0–0.2)
BASOPHILS NFR BLD AUTO: 0.5 % (ref 0–2.5)
BILIRUB SERPL-MCNC: 1.6 MG/DL (ref 0.2–1.3)
BUN BLD-MCNC: 8 MG/DL (ref 7–20)
BUN/CREAT SERPL: 13.3 (ref 6.3–21.9)
CALCIUM SPEC-SCNC: 9.4 MG/DL (ref 8.4–10.2)
CHLORIDE SERPL-SCNC: 94 MMOL/L (ref 98–107)
CO2 SERPL-SCNC: 32 MMOL/L (ref 26–30)
CREAT BLD-MCNC: 0.6 MG/DL (ref 0.6–1.3)
DEPRECATED RDW RBC AUTO: 43.7 FL (ref 37–54)
EOSINOPHIL # BLD AUTO: 0.07 10*3/MM3 (ref 0–0.7)
EOSINOPHIL NFR BLD AUTO: 0.9 % (ref 0–7)
ERYTHROCYTE [DISTWIDTH] IN BLOOD BY AUTOMATED COUNT: 13 % (ref 11.5–14.5)
GFR SERPL CREATININE-BSD FRML MDRD: 140 ML/MIN/1.73
GLOBULIN UR ELPH-MCNC: 4 GM/DL
GLUCOSE BLD-MCNC: 255 MG/DL (ref 74–98)
HCT VFR BLD AUTO: 50.1 % (ref 42–52)
HGB BLD-MCNC: 17.9 G/DL (ref 14–18)
IMM GRANULOCYTES # BLD: 0.02 10*3/MM3 (ref 0–0.06)
IMM GRANULOCYTES NFR BLD: 0.2 % (ref 0–0.6)
LYMPHOCYTES # BLD AUTO: 1.11 10*3/MM3 (ref 0.6–3.4)
LYMPHOCYTES NFR BLD AUTO: 13.7 % (ref 10–50)
MCH RBC QN AUTO: 32.7 PG (ref 27–31)
MCHC RBC AUTO-ENTMCNC: 35.7 G/DL (ref 30–37)
MCV RBC AUTO: 91.6 FL (ref 80–94)
MONOCYTES # BLD AUTO: 0.96 10*3/MM3 (ref 0–0.9)
MONOCYTES NFR BLD AUTO: 11.8 % (ref 0–12)
NEUTROPHILS # BLD AUTO: 5.92 10*3/MM3 (ref 2–6.9)
NEUTROPHILS NFR BLD AUTO: 72.9 % (ref 37–80)
NRBC BLD MANUAL-RTO: 0 /100 WBC (ref 0–0)
PLATELET # BLD AUTO: 182 10*3/MM3 (ref 130–400)
PMV BLD AUTO: 12 FL (ref 6–12)
POTASSIUM BLD-SCNC: 4.2 MMOL/L (ref 3.5–5.1)
PROT SERPL-MCNC: 8.2 G/DL (ref 6.3–8.2)
RBC # BLD AUTO: 5.47 10*6/MM3 (ref 4.7–6.1)
SODIUM BLD-SCNC: 134 MMOL/L (ref 137–145)
T4 FREE SERPL-MCNC: 1.25 NG/DL (ref 0.78–2.19)
TSH SERPL DL<=0.05 MIU/L-ACNC: 4.24 MIU/ML (ref 0.47–4.68)
WBC NRBC COR # BLD: 8.12 10*3/MM3 (ref 4.8–10.8)

## 2017-04-12 PROCEDURE — 96415 CHEMO IV INFUSION ADDL HR: CPT

## 2017-04-12 PROCEDURE — 36415 COLL VENOUS BLD VENIPUNCTURE: CPT

## 2017-04-12 PROCEDURE — 96417 CHEMO IV INFUS EACH ADDL SEQ: CPT

## 2017-04-12 PROCEDURE — 25010000002 IPILIMUMAB 50 MG/10ML SOLUTION 10 ML VIAL: Performed by: NURSE PRACTITIONER

## 2017-04-12 PROCEDURE — 25010000002 IPILIMUMAB 50 MG/10ML SOLUTION 40 ML VIAL: Performed by: NURSE PRACTITIONER

## 2017-04-12 PROCEDURE — 25010000002 NIVOLUMAB 40 MG/4ML SOLUTION 10 ML VIAL: Performed by: NURSE PRACTITIONER

## 2017-04-12 PROCEDURE — 84439 ASSAY OF FREE THYROXINE: CPT

## 2017-04-12 PROCEDURE — 80050 GENERAL HEALTH PANEL: CPT

## 2017-04-12 PROCEDURE — 82533 TOTAL CORTISOL: CPT

## 2017-04-12 PROCEDURE — 96413 CHEMO IV INFUSION 1 HR: CPT

## 2017-04-12 RX ORDER — SODIUM CHLORIDE 9 MG/ML
250 INJECTION, SOLUTION INTRAVENOUS ONCE
Status: CANCELLED | OUTPATIENT
Start: 2017-04-12

## 2017-04-12 RX ORDER — SODIUM CHLORIDE 9 MG/ML
250 INJECTION, SOLUTION INTRAVENOUS ONCE
Status: DISCONTINUED | OUTPATIENT
Start: 2017-04-12 | End: 2017-04-12 | Stop reason: HOSPADM

## 2017-04-12 RX ADMIN — SODIUM CHLORIDE 265 MG: 900 INJECTION, SOLUTION INTRAVENOUS at 15:49

## 2017-04-12 RX ADMIN — SODIUM CHLORIDE 90 MG: 9 INJECTION, SOLUTION INTRAVENOUS at 14:34

## 2017-04-13 ENCOUNTER — APPOINTMENT (OUTPATIENT)
Dept: CT IMAGING | Facility: HOSPITAL | Age: 57
End: 2017-04-13
Attending: INTERNAL MEDICINE

## 2017-04-13 LAB — CORTIS SERPL-MCNC: 12 UG/DL

## 2017-04-17 ENCOUNTER — APPOINTMENT (OUTPATIENT)
Dept: MRI IMAGING | Facility: HOSPITAL | Age: 57
End: 2017-04-17
Attending: INTERNAL MEDICINE

## 2017-04-17 ENCOUNTER — TELEPHONE (OUTPATIENT)
Dept: ONCOLOGY | Facility: CLINIC | Age: 57
End: 2017-04-17

## 2017-04-17 ENCOUNTER — APPOINTMENT (OUTPATIENT)
Dept: PET IMAGING | Facility: HOSPITAL | Age: 57
End: 2017-04-17
Attending: INTERNAL MEDICINE

## 2017-04-17 ENCOUNTER — HOSPITAL ENCOUNTER (OUTPATIENT)
Dept: PET IMAGING | Facility: HOSPITAL | Age: 57
Discharge: HOME | End: 2017-04-17
Attending: INTERNAL MEDICINE

## 2017-04-17 DIAGNOSIS — C77.9 METASTATIC MELANOMA TO LYMPH NODE (HCC): ICD-10-CM

## 2017-04-17 DIAGNOSIS — C77.9 METASTATIC MELANOMA TO LYMPH NODE (HCC): Primary | ICD-10-CM

## 2017-04-17 RX ORDER — LOPERAMIDE HYDROCHLORIDE 2 MG/1
2 TABLET ORAL 4 TIMES DAILY PRN
Qty: 30 TABLET | Refills: 0 | Status: SHIPPED | OUTPATIENT
Start: 2017-04-17 | End: 2017-04-27

## 2017-04-17 NOTE — TELEPHONE ENCOUNTER
Advised patient to stop the reglan, as it can contribute to his symptoms. To continue zofran and imodium.   Scheduling to have patient come to [place] for IVF on Tuesday and Wednesday and to reschedule scans and port.  Referral placed to dr Gross for port placement.

## 2017-04-17 NOTE — TELEPHONE ENCOUNTER
----- Message from Leia Valente sent at 4/17/2017  8:11 AM EDT -----  Regarding: BADIN - SYMPTOMS / SCANS  Contact: [phone]  Rosangela, sister of patient had to cancel the Pet Scan and MRI that was scheduled for today. She said patient is extremely weak and has been vomiting, has diarrhea, and stays sick to his stomach. He isn't able to eat anything.  She didn't know what they should do, please call her back.

## 2017-04-18 ENCOUNTER — TELEPHONE (OUTPATIENT)
Dept: ONCOLOGY | Facility: CLINIC | Age: 57
End: 2017-04-18

## 2017-04-18 NOTE — TELEPHONE ENCOUNTER
Received call from [facility] stating that patient cancelled IVF for today d/t weakness.   Called patient's sister, Rosangela, and advised that the IVF will help with the weakness, dehydration. Sister states that she is trying to get patient to agree to get the fluids, but he refuses. Advised that if he continues to get weaker he may need to go to the ER and we are trying to prevent that by getting him in for fluids. Sister states that she has told him that as well.  Advised that if he changes his mind, will make sure he gets in today for fluids in [place]. Also to keep appointment tomorrow for fluids as well if patient is agreeable, and will ask Dr Martins to see patient in infusion.  To call back with any questions or concerns.

## 2017-04-20 ENCOUNTER — APPOINTMENT (OUTPATIENT)
Dept: MRI IMAGING | Facility: HOSPITAL | Age: 57
End: 2017-04-20
Attending: INTERNAL MEDICINE

## 2017-04-24 ENCOUNTER — TELEPHONE (OUTPATIENT)
Dept: ONCOLOGY | Facility: CLINIC | Age: 57
End: 2017-04-24

## 2017-04-24 RX ORDER — OXYCODONE HYDROCHLORIDE AND ACETAMINOPHEN 5; 325 MG/1; MG/1
1 TABLET ORAL EVERY 6 HOURS PRN
Qty: 120 TABLET | Refills: 0 | Status: SHIPPED | OUTPATIENT
Start: 2017-04-24 | End: 2017-05-17 | Stop reason: SDUPTHER

## 2017-04-24 NOTE — TELEPHONE ENCOUNTER
Advised of appointment with Dr Wilson on 4/27 @ 1400 to discuss port placement.  Percocet refill will be sent to [facility] for patient to  tomorrow.

## 2017-04-24 NOTE — TELEPHONE ENCOUNTER
----- Message from Nancy Kim sent at 4/24/2017  9:04 AM EDT -----  Regarding: miranda         refill and yes to port  Contact: [phone]  Patient sister (aris) called  Pt needs a script for his pain meds, she would like to  in [place] tomorrow.    Also pt had stated he did not want a port, pt has changed his mind.    Please call aris to discuss  [phone]

## 2017-04-27 ENCOUNTER — OFFICE VISIT (OUTPATIENT)
Dept: SURGERY | Facility: CLINIC | Age: 57
End: 2017-04-27

## 2017-04-27 ENCOUNTER — HOSPITAL ENCOUNTER (OUTPATIENT)
Facility: HOSPITAL | Age: 57
Setting detail: HOSPITAL OUTPATIENT SURGERY
End: 2017-04-27
Attending: SURGERY | Admitting: SURGERY

## 2017-04-27 VITALS
TEMPERATURE: 99.2 F | BODY MASS INDEX: 29.98 KG/M2 | DIASTOLIC BLOOD PRESSURE: 90 MMHG | WEIGHT: 191 LBS | SYSTOLIC BLOOD PRESSURE: 180 MMHG | HEIGHT: 67 IN

## 2017-04-27 DIAGNOSIS — Z45.2 ENCOUNTER FOR CARE RELATED TO PORT-A-CATH: ICD-10-CM

## 2017-04-27 DIAGNOSIS — C43.9 METASTATIC MELANOMA (HCC): Primary | ICD-10-CM

## 2017-04-27 PROCEDURE — 99213 OFFICE O/P EST LOW 20 MIN: CPT | Performed by: SURGERY

## 2017-04-27 RX ORDER — SODIUM CHLORIDE 9 MG/ML
50 INJECTION, SOLUTION INTRAVENOUS CONTINUOUS
Status: CANCELLED | OUTPATIENT
Start: 2017-04-27

## 2017-04-27 NOTE — PROGRESS NOTES
Patient: Lennie Arroyo    YOB: 1960    Date: 04/27/2017    Primary Care Provider: Andressa Chacko MD    Reason for Consultation: Portacath consult    Chief complaint:   Chief Complaint   Patient presents with   • Follow-up     Metastatic melanoma needs port a cath.       Subjective .     History of present illness:  I saw the patient in the office today as a consultation for evaluation  For a Port-A-Cath.  He has metastatic melanoma.  They're having trouble with IV access.    Review of Systems   Constitutional: Negative for chills, fever and unexpected weight change.   HENT: Negative for trouble swallowing and voice change.    Eyes: Negative for visual disturbance.   Respiratory: Negative for apnea, cough, chest tightness, shortness of breath and wheezing.    Cardiovascular: Negative for chest pain, palpitations and leg swelling.   Gastrointestinal: Positive for vomiting. Negative for abdominal distention, abdominal pain, anal bleeding, blood in stool, constipation, diarrhea, nausea and rectal pain.   Endocrine: Negative for cold intolerance and heat intolerance.   Genitourinary: Negative for difficulty urinating, dysuria, flank pain, scrotal swelling and testicular pain.   Musculoskeletal: Negative for back pain, gait problem and joint swelling.   Skin: Negative for color change, rash and wound.   Neurological: Negative for dizziness, syncope, speech difficulty, weakness, numbness and headaches.   Hematological: Negative for adenopathy. Does not bruise/bleed easily.   Psychiatric/Behavioral: Negative for confusion. The patient is not nervous/anxious.        History:  Past Medical History:   Diagnosis Date   • Arthritis    • Cancer     skin/face   • Diabetes mellitus    • Gout    • Hypertension        Past Surgical History:   Procedure Laterality Date   • LIP RECONSTRUCTION     • SKIN CANCER EXCISION      face-2004; lip-2005   • SOFT TISSUE BIOPSY      groin       Family History    Problem Relation Age of Onset   • Skin cancer Mother    • Asthma Mother    • Other Mother       of sepsis   • Heart disease Mother      enlarged heart   • Lung cancer Father    • Throat cancer Father    • COPD Father    • Melanoma Sister    • Diabetes Brother    • Liver cancer Brother    • Diabetes Brother    • Diabetes Brother    • Breast cancer Maternal Aunt        Social History   Substance Use Topics   • Smoking status: Never Smoker   • Smokeless tobacco: Never Used   • Alcohol use Yes      Comment: 5-6 nightly       Medications:     Current Outpatient Prescriptions:   •  lisinopril (PRINIVIL,ZESTRIL) 10 MG tablet, Take 10 mg by mouth Daily., Disp: , Rfl:   •  loperamide (IMODIUM A-D) 2 MG tablet, Take 1 tablet by mouth 4 (Four) Times a Day As Needed for Diarrhea for up to 10 days., Disp: 30 tablet, Rfl: 0  •  losartan (COZAAR) 50 MG tablet, Take 50 mg by mouth Daily., Disp: , Rfl:   •  metFORMIN (GLUCOPHAGE) 500 MG tablet, Take 500 mg by mouth 2 (Two) Times a Day With Meals., Disp: , Rfl:   •  metoclopramide (REGLAN) 5 MG tablet, Take 1 tablet by mouth 3 (Three) Times a Day., Disp: 90 tablet, Rfl: 5  •  ondansetron (ZOFRAN) 8 MG tablet, Take 1 tablet by mouth 3 (Three) Times a Day As Needed for Nausea or Vomiting., Disp: 30 tablet, Rfl: 5  •  oxyCODONE-acetaminophen (PERCOCET) 5-325 MG per tablet, Take 1 tablet by mouth Every 6 (Six) Hours As Needed for Moderate Pain (4-6)., Disp: 120 tablet, Rfl: 0  •  promethazine (PHENERGAN) 25 MG tablet, Take 25 mg by mouth Every 6 (Six) Hours As Needed for Nausea or Vomiting., Disp: , Rfl:      Allergies:  No Known Allergies    Objective     Vital Signs:   Temp:  [99.2 °F (37.3 °C)] 99.2 °F (37.3 °C)  BP: (180)/(90) 180/90    Physical Exam:   General Appearance:    Alert, cooperative, in no acute distress   Head:    Normocephalic, without obvious abnormality, atraumatic   Eyes:            Lids and lashes normal, conjunctivae and sclerae normal, no   icterus, no  pallor, corneas clear,   Ears:    Ears appear intact with no abnormalities noted   Lungs:     Clear to auscultation,respirations regular, even and                  Unlabored    Heart:    Regular rhythm and normal rate, no murmur, no gallop.   Abdomen:     Normal bowel sounds, no masses, no organomegaly, soft        non-tender, non-distended, no guarding.   Extremities:   Moves all extremities well, no edema, no cyanosis, no             redness     Results Review:   None    Assessment/Plan :    1. Metastatic melanoma    2. Encounter for care related to Port-a-Cath        I offered the patient a Mehreen-cath. I explained the procedure in detail and the patient understood the procedure. The patient also understands the risks which include but are not limited to bleeding, infection, pneumothorax, DVT etc. I have answered their questions and they wish to proceed with Mehreen-cath placement.Incidentally, he also understands that aspirating blood occasionally can be difficult from ports.     Teja Wilson MD  04/27/17  2:04 PM

## 2017-05-01 VITALS — BODY MASS INDEX: 28.95 KG/M2 | WEIGHT: 191 LBS | HEIGHT: 68 IN

## 2017-05-02 ENCOUNTER — TELEPHONE (OUTPATIENT)
Dept: ONCOLOGY | Facility: CLINIC | Age: 57
End: 2017-05-02

## 2017-05-10 ENCOUNTER — TELEPHONE (OUTPATIENT)
Dept: ONCOLOGY | Facility: CLINIC | Age: 57
End: 2017-05-10

## 2017-05-17 ENCOUNTER — INFUSION (OUTPATIENT)
Dept: ONCOLOGY | Facility: HOSPITAL | Age: 57
End: 2017-05-17

## 2017-05-17 ENCOUNTER — OFFICE VISIT (OUTPATIENT)
Dept: ONCOLOGY | Facility: CLINIC | Age: 57
End: 2017-05-17

## 2017-05-17 VITALS
WEIGHT: 192 LBS | SYSTOLIC BLOOD PRESSURE: 169 MMHG | TEMPERATURE: 98 F | HEART RATE: 97 BPM | BODY MASS INDEX: 29.19 KG/M2 | RESPIRATION RATE: 20 BRPM | DIASTOLIC BLOOD PRESSURE: 80 MMHG

## 2017-05-17 DIAGNOSIS — C77.9 METASTATIC MELANOMA TO LYMPH NODE (HCC): Primary | ICD-10-CM

## 2017-05-17 PROCEDURE — 96417 CHEMO IV INFUS EACH ADDL SEQ: CPT

## 2017-05-17 PROCEDURE — 96415 CHEMO IV INFUSION ADDL HR: CPT

## 2017-05-17 PROCEDURE — 96413 CHEMO IV INFUSION 1 HR: CPT

## 2017-05-17 PROCEDURE — 99215 OFFICE O/P EST HI 40 MIN: CPT | Performed by: INTERNAL MEDICINE

## 2017-05-17 PROCEDURE — 25010000002 NIVOLUMAB 100 MG/10ML SOLUTION 10 ML VIAL: Performed by: NURSE PRACTITIONER

## 2017-05-17 PROCEDURE — 25010000002 IPILIMUMAB 200 MG/40ML SOLUTION 40 ML VIAL: Performed by: NURSE PRACTITIONER

## 2017-05-17 PROCEDURE — 25010000002 IPILIMUMAB 200 MG/40ML SOLUTION 10 ML VIAL: Performed by: NURSE PRACTITIONER

## 2017-05-17 RX ORDER — SODIUM CHLORIDE 9 MG/ML
250 INJECTION, SOLUTION INTRAVENOUS ONCE
Status: DISCONTINUED | OUTPATIENT
Start: 2017-05-17 | End: 2017-05-17 | Stop reason: HOSPADM

## 2017-05-17 RX ORDER — ATORVASTATIN CALCIUM 20 MG/1
20 TABLET, FILM COATED ORAL DAILY
COMMUNITY

## 2017-05-17 RX ORDER — HYDROXYZINE HYDROCHLORIDE 25 MG/1
25 TABLET, FILM COATED ORAL 3 TIMES DAILY PRN
COMMUNITY

## 2017-05-17 RX ORDER — SODIUM CHLORIDE 9 MG/ML
250 INJECTION, SOLUTION INTRAVENOUS ONCE
Status: CANCELLED | OUTPATIENT
Start: 2017-05-17

## 2017-05-17 RX ORDER — OXYCODONE HYDROCHLORIDE AND ACETAMINOPHEN 5; 325 MG/1; MG/1
1 TABLET ORAL EVERY 6 HOURS PRN
Qty: 120 TABLET | Refills: 0 | Status: SHIPPED | OUTPATIENT
Start: 2017-05-17 | End: 2017-06-07 | Stop reason: SDUPTHER

## 2017-05-17 RX ORDER — GLIPIZIDE 5 MG/1
5 TABLET ORAL
COMMUNITY

## 2017-05-17 RX ADMIN — SODIUM CHLORIDE 90 MG: 9 INJECTION, SOLUTION INTRAVENOUS at 12:27

## 2017-05-17 RX ADMIN — SODIUM CHLORIDE 265 MG: 9 INJECTION, SOLUTION INTRAVENOUS at 13:45

## 2017-06-02 ENCOUNTER — TELEPHONE (OUTPATIENT)
Dept: ONCOLOGY | Facility: CLINIC | Age: 57
End: 2017-06-02

## 2017-06-02 RX ORDER — DIPHENOXYLATE HYDROCHLORIDE AND ATROPINE SULFATE 2.5; .025 MG/1; MG/1
1 TABLET ORAL EVERY 6 HOURS PRN
Qty: 30 TABLET | Refills: 5 | OUTPATIENT
Start: 2017-06-02

## 2017-06-02 NOTE — TELEPHONE ENCOUNTER
"----- Message from Nancy Kim sent at 6/2/2017  9:39 AM EDT -----  Regarding: BADIN DIARRHEA  Contact: [phone]  THIS PT SISTER OCTAVIANO CALLED.  THIS PT HAS DIARRHEA  SHE STATES HE IS GOING TO THE BATH ROOM  \"20 TO 30 TIMES A DAY\".    PLEASE CALL TO LET HIM KNOW WHAT HE SHOULD DO.    "

## 2017-06-02 NOTE — TELEPHONE ENCOUNTER
"States patient is feeling well other than diarrhea, and has tried \"everything over the counter\" to stop it. Lomotil called to pharmacy for patient, and advised to call back if any further questions or concerns  "

## 2017-06-07 ENCOUNTER — OFFICE VISIT (OUTPATIENT)
Dept: ONCOLOGY | Facility: CLINIC | Age: 57
End: 2017-06-07

## 2017-06-07 ENCOUNTER — APPOINTMENT (OUTPATIENT)
Dept: ONCOLOGY | Facility: HOSPITAL | Age: 57
End: 2017-06-07

## 2017-06-07 VITALS
BODY MASS INDEX: 28.59 KG/M2 | WEIGHT: 188 LBS | DIASTOLIC BLOOD PRESSURE: 82 MMHG | SYSTOLIC BLOOD PRESSURE: 138 MMHG | TEMPERATURE: 97.6 F | HEART RATE: 94 BPM | RESPIRATION RATE: 21 BRPM

## 2017-06-07 DIAGNOSIS — C77.9 METASTATIC MELANOMA TO LYMPH NODE (HCC): Primary | ICD-10-CM

## 2017-06-07 PROCEDURE — 99215 OFFICE O/P EST HI 40 MIN: CPT | Performed by: INTERNAL MEDICINE

## 2017-06-07 RX ORDER — PREDNISONE 10 MG/1
TABLET ORAL
Qty: 26 TABLET | Refills: 0 | Status: SHIPPED | OUTPATIENT
Start: 2017-06-07 | End: 2017-06-23

## 2017-06-07 RX ORDER — SODIUM CHLORIDE 9 MG/ML
250 INJECTION, SOLUTION INTRAVENOUS ONCE
Status: CANCELLED | OUTPATIENT
Start: 2017-06-14

## 2017-06-07 RX ORDER — OXYCODONE HYDROCHLORIDE AND ACETAMINOPHEN 5; 325 MG/1; MG/1
1 TABLET ORAL EVERY 6 HOURS PRN
Qty: 120 TABLET | Refills: 0 | Status: SHIPPED | OUTPATIENT
Start: 2017-06-07 | End: 2017-07-05 | Stop reason: SDUPTHER

## 2017-06-07 RX ORDER — SODIUM CHLORIDE 9 MG/ML
250 INJECTION, SOLUTION INTRAVENOUS ONCE
Status: CANCELLED | OUTPATIENT
Start: 2017-01-01

## 2017-06-07 NOTE — PROGRESS NOTES
DATE OF VISIT: 6/7/2017    REASON FOR VISIT: Followup for Metastatic melanoma with spindle cell features      HISTORY OF PRESENT ILLNESS: The patient is a very pleasant 56 y.o. male  who was in his usual state of health until approximately February 2017. The patient presented with mass in his left groin, this has been gradually increasing in size. Patient never had this problem before. This is associated with mild pain get worse with activity and improves with laying flat. Patient went and so his primary care provider who referred him to see his surgeon. Patient so Dr. Wilson on February 28, 2017. Patient had a biopsy done March 3, 2017 that revealed metastatic melanoma.  The patient had CAT scan abdomen and pelvis done February 12, 2017 revealed left inguinal and iliac chain adenopathy.  Unfortunately insurance had declined whole body PET scan as well as MRI brain for staging purposes and there was no option.  The patient was started on Opdivo plus Yervoy on April 12, 2017.  The patient is here today for cycle #3.    SUBJECTIVE: The patient has been having 3-5 diarrhea stools every day for the last 3 weeks that just started to improve over the last 2 days.. he has been complaining of nausea every time he tries to eat, Zofran is not helping. he denied any fever or chills, no night sweats, denied any headaches.  He has diffuse skin rash.  His itching and improves partially with Benadryl.     PAST MEDICAL HISTORY/SOCIAL HISTORY/FAMILY HISTORY: Unchanged from my prior documentation done on March 27 2017    Review of Systems   Constitutional: Negative for activity change, appetite change, chills, fatigue, fever and unexpected weight change.   HENT: Negative for hearing loss, mouth sores, nosebleeds, sore throat and trouble swallowing.    Eyes: Negative for visual disturbance.   Respiratory: Negative for cough, chest tightness, shortness of breath and wheezing.    Cardiovascular: Negative for chest pain, palpitations and  leg swelling.   Gastrointestinal: Negative for abdominal distention, abdominal pain, blood in stool, constipation, diarrhea, nausea, rectal pain and vomiting.   Endocrine: Negative for cold intolerance and heat intolerance.   Genitourinary: Negative for difficulty urinating, dysuria, frequency and urgency.   Musculoskeletal: Negative for arthralgias, back pain, gait problem, joint swelling and myalgias.   Skin: Negative for rash.   Neurological: Negative for dizziness, tremors, syncope, weakness, light-headedness, numbness and headaches.   Hematological: Negative for adenopathy. Does not bruise/bleed easily.   Psychiatric/Behavioral: Negative for confusion, sleep disturbance and suicidal ideas. The patient is not nervous/anxious.          Current Outpatient Prescriptions:   •  atorvastatin (LIPITOR) 20 MG tablet, Take 20 mg by mouth Daily., Disp: , Rfl:   •  diphenoxylate-atropine (LOMOTIL) 2.5-0.025 MG per tablet, Take 1 tablet by mouth Every 6 (Six) Hours As Needed for Diarrhea. 1 tablet, Disp: 30 tablet, Rfl: 5  •  glipiZIDE (GLUCOTROL) 5 MG tablet, Take 5 mg by mouth 2 (Two) Times a Day Before Meals., Disp: , Rfl:   •  hydrOXYzine (ATARAX) 25 MG tablet, Take 25 mg by mouth 3 (Three) Times a Day As Needed for Itching., Disp: , Rfl:   •  lisinopril (PRINIVIL,ZESTRIL) 10 MG tablet, Take 10 mg by mouth Daily., Disp: , Rfl:   •  losartan (COZAAR) 50 MG tablet, Take 50 mg by mouth Daily., Disp: , Rfl:   •  metoclopramide (REGLAN) 5 MG tablet, Take 1 tablet by mouth 3 (Three) Times a Day., Disp: 90 tablet, Rfl: 5  •  ondansetron (ZOFRAN) 8 MG tablet, Take 1 tablet by mouth 3 (Three) Times a Day As Needed for Nausea or Vomiting., Disp: 30 tablet, Rfl: 5  •  oxyCODONE-acetaminophen (PERCOCET) 5-325 MG per tablet, Take 1 tablet by mouth Every 6 (Six) Hours As Needed for Moderate Pain (4-6)., Disp: 120 tablet, Rfl: 0    PHYSICAL EXAMINATION:   There were no vitals taken for this visit.   ECOG Performance Status: 0 -  Asymptomatic  General Appearance:  alert, cooperative, no apparent distress and appears stated age   Neurologic/Psychiatric: A&O x 3, gait steady, appropriate affect, strength 5/5 in all muscle groups   HEENT:  Normocephalic, without obvious abnormality, mucous membranes moist   Neck: Supple, symmetrical, trachea midline, no adenopathy;  No thyromegaly, masses, or tenderness   Lungs:   Clear to auscultation bilaterally; respirations regular, even, and unlabored bilaterally   Heart:  Regular rate and rhythm, no murmurs appreciated   Abdomen:   Soft, non-tender, non-distended and no organomegaly   Lymph nodes: No cervical, supraclavicular, inguinal or axillary adenopathy noted   Extremities: Normal, atraumatic; no clubbing, cyanosis, or edema    Skin: No rashes, ulcers, or suspicious lesions noted     No visits with results within 2 Week(s) from this visit.  Latest known visit with results is:    Infusion on 04/12/2017   Component Date Value Ref Range Status   • Glucose 04/12/2017 255* 74 - 98 mg/dL Final    Glucose >180, Hemoglobin A1C recommended.   • BUN 04/12/2017 8  7 - 20 mg/dL Final   • Creatinine 04/12/2017 0.60  0.60 - 1.30 mg/dL Final   • Sodium 04/12/2017 134* 137 - 145 mmol/L Final   • Potassium 04/12/2017 4.2  3.5 - 5.1 mmol/L Final   • Chloride 04/12/2017 94* 98 - 107 mmol/L Final   • CO2 04/12/2017 32.0* 26.0 - 30.0 mmol/L Final   • Calcium 04/12/2017 9.4  8.4 - 10.2 mg/dL Final   • Total Protein 04/12/2017 8.2  6.3 - 8.2 g/dL Final   • Albumin 04/12/2017 4.20  3.50 - 5.00 g/dL Final   • ALT (SGPT) 04/12/2017 29  13 - 69 U/L Final   • AST (SGOT) 04/12/2017 30  15 - 46 U/L Final   • Alkaline Phosphatase 04/12/2017 154* 38 - 126 U/L Final   • Total Bilirubin 04/12/2017 1.6* 0.2 - 1.3 mg/dL Final   • eGFR Non African Amer 04/12/2017 140  >60 mL/min/1.73 Final   • Globulin 04/12/2017 4.0  gm/dL Final   • A/G Ratio 04/12/2017 1.1  1.0 - 2.0 g/dL Final   • BUN/Creatinine Ratio 04/12/2017 13.3  6.3 - 21.9  Final   • Anion Gap 04/12/2017 12.2  mmol/L Final   • TSH 04/12/2017 4.240  0.470 - 4.680 mIU/mL Final   • Free T4 04/12/2017 1.25  0.78 - 2.19 ng/dL Final   • Cortisol 04/12/2017 12.0  ug/dL Final                            Cortisol AM         6.2 - 19.4                          Cortisol PM         2.3 - 11.9   • WBC 04/12/2017 8.12  4.80 - 10.80 10*3/mm3 Final   • RBC 04/12/2017 5.47  4.70 - 6.10 10*6/mm3 Final   • Hemoglobin 04/12/2017 17.9  14.0 - 18.0 g/dL Final   • Hematocrit 04/12/2017 50.1  42.0 - 52.0 % Final   • MCV 04/12/2017 91.6  80.0 - 94.0 fL Final   • MCH 04/12/2017 32.7* 27.0 - 31.0 pg Final   • MCHC 04/12/2017 35.7  30.0 - 37.0 g/dL Final   • RDW 04/12/2017 13.0  11.5 - 14.5 % Final   • RDW-SD 04/12/2017 43.7  37.0 - 54.0 fl Final   • MPV 04/12/2017 12.0  6.0 - 12.0 fL Final   • Platelets 04/12/2017 182  130 - 400 10*3/mm3 Final   • Neutrophil % 04/12/2017 72.9  37.0 - 80.0 % Final   • Lymphocyte % 04/12/2017 13.7  10.0 - 50.0 % Final   • Monocyte % 04/12/2017 11.8  0.0 - 12.0 % Final   • Eosinophil % 04/12/2017 0.9  0.0 - 7.0 % Final   • Basophil % 04/12/2017 0.5  0.0 - 2.5 % Final   • Immature Grans % 04/12/2017 0.2  0.0 - 0.6 % Final   • Neutrophils, Absolute 04/12/2017 5.92  2.00 - 6.90 10*3/mm3 Final   • Lymphocytes, Absolute 04/12/2017 1.11  0.60 - 3.40 10*3/mm3 Final   • Monocytes, Absolute 04/12/2017 0.96* 0.00 - 0.90 10*3/mm3 Final   • Eosinophils, Absolute 04/12/2017 0.07  0.00 - 0.70 10*3/mm3 Final   • Basophils, Absolute 04/12/2017 0.04  0.00 - 0.20 10*3/mm3 Final   • Immature Grans, Absolute 04/12/2017 0.02  0.00 - 0.06 10*3/mm3 Final   • nRBC 04/12/2017 0.0  0.0 - 0.0 /100 WBC Final        No results found.    ASSESSMENT: The patient is a very pleasant 56 y.o. male with metastatic melanoma     PROBLEM LIST:   1. Metastatic melanoma diagnosed after ultrasound-guided biopsy left inguinal lymph node March 2, 2017.  TX NX M1 B.  Disease burden left inguinal and left iliac chain  lymphadenopathy.  2. Remote history of melanoma left check and left lower lip 12 and 13 years ago.  3. Started Opdivo plus Yervoy April 12, 2017, status post 2 cycles   4.  Treatment induced colitis, grade 2  5.  Treatment induced dermatitis, grade 2  6.  Uncontrolled type 2 diabetes  7 . Cancer related pain  8.  Alcohol abuse    PLAN:  1. I will hold treatment today secondary to diarrhea and rash.  I will resume treatment next week.  2.  The patient will follow-up with me in 4 weeks for cycle #4.  3. We discussed potential side effects of immunotherapy including but not limited to immune mediated reactions with thyroiditis, pneumonitis, hepatitis, colitis, rash, and electrolytes abnormalities, fatigue, multiorgan failure, and possibly death.  4.  I will order a follow-up CT scans after completion of cycle #4.  5.  I will do Opdivo maintenance if his next scans looked stable or better.  6.  I will continue Percocet 5/325 mg 6 hours as needed for cancer-related pain.  I will refill it today.  I don't see a reason to go up on the dose since patient mass is smaller.    7.  I'll be monitoring patient blood counts kidney function liver function as well as thyroid function.  8.  I will start the patient on Reglan 5 mg 3 times a day as well as Zofran 8 mg every 6 hours as needed for nausea.  9.  Patient will follow-up with Dr. Chacko for uncontrolled type 2 diabetes.  10.  I will start the patient prednisone 20 mg daily for 6 days and then 10 mg daily.  Hopefully this will help his diarrhea as well as his rash.  11.  I will start the patient on Imodium as well as Lomotil as needed for diarrhea.  12.  I'll start the patient on Benadryl as needed for itching  Kavitha Martins MD  6/7/2017

## 2017-06-15 ENCOUNTER — INFUSION (OUTPATIENT)
Dept: ONCOLOGY | Facility: HOSPITAL | Age: 57
End: 2017-06-15

## 2017-06-15 VITALS
SYSTOLIC BLOOD PRESSURE: 174 MMHG | WEIGHT: 189 LBS | BODY MASS INDEX: 28.74 KG/M2 | HEART RATE: 71 BPM | RESPIRATION RATE: 20 BRPM | TEMPERATURE: 98.3 F | DIASTOLIC BLOOD PRESSURE: 84 MMHG

## 2017-06-15 DIAGNOSIS — C77.9 METASTATIC MELANOMA TO LYMPH NODE (HCC): Primary | ICD-10-CM

## 2017-06-15 PROCEDURE — 25010000002 IPILIMUMAB 200 MG/40ML SOLUTION 40 ML VIAL: Performed by: INTERNAL MEDICINE

## 2017-06-15 PROCEDURE — 25010000002 NIVOLUMAB 100 MG/10ML SOLUTION 10 ML VIAL: Performed by: INTERNAL MEDICINE

## 2017-06-15 PROCEDURE — 25010000002 IPILIMUMAB 200 MG/40ML SOLUTION 10 ML VIAL: Performed by: INTERNAL MEDICINE

## 2017-06-15 PROCEDURE — 96417 CHEMO IV INFUS EACH ADDL SEQ: CPT

## 2017-06-15 PROCEDURE — 96413 CHEMO IV INFUSION 1 HR: CPT

## 2017-06-15 PROCEDURE — 96415 CHEMO IV INFUSION ADDL HR: CPT

## 2017-06-15 RX ORDER — SODIUM CHLORIDE 9 MG/ML
250 INJECTION, SOLUTION INTRAVENOUS ONCE
Status: DISCONTINUED | OUTPATIENT
Start: 2017-06-15 | End: 2017-06-15 | Stop reason: HOSPADM

## 2017-06-15 RX ADMIN — SODIUM CHLORIDE 265 MG: 9 INJECTION, SOLUTION INTRAVENOUS at 10:31

## 2017-06-15 RX ADMIN — SODIUM CHLORIDE 90 MG: 9 INJECTION, SOLUTION INTRAVENOUS at 09:26

## 2017-06-23 ENCOUNTER — TELEPHONE (OUTPATIENT)
Dept: ONCOLOGY | Facility: CLINIC | Age: 57
End: 2017-06-23

## 2017-06-23 RX ORDER — PREDNISONE 10 MG/1
10 TABLET ORAL TAKE AS DIRECTED
Qty: 105 TABLET | Refills: 0 | Status: SHIPPED | OUTPATIENT
Start: 2017-06-23 | End: 2017-01-01 | Stop reason: HOSPADM

## 2017-06-23 NOTE — TELEPHONE ENCOUNTER
"----- Message from Nancy Kim sent at 6/23/2017  8:13 AM EDT -----  Regarding: miranda   pt sick  Contact: [phone]  Pt cramping in stomach,\" hurting real bad.\"  Stool is nothing but green water  Call aris sister to discuss  "

## 2017-06-23 NOTE — TELEPHONE ENCOUNTER
"Patient having 7-8 episodes of \"green, watery diarrhea\" daily with stomach pain and cramps. Has been taking imodium and lomotil that have not helped. Discussed with RACH Panda, and will start patient on prednisone taper, starting with 60mg and decreasing by 10mg every 5 days. Rosangela informed of this. To call back if this does not help his symptoms, and encouraged to keep appt with Dr Martins on 7/5. Advised to stop the imodium and lomotil, as symptoms likely related to inflammation from the treatment.  "

## 2017-07-05 ENCOUNTER — OFFICE VISIT (OUTPATIENT)
Dept: ONCOLOGY | Facility: CLINIC | Age: 57
End: 2017-07-05

## 2017-07-05 VITALS
BODY MASS INDEX: 28.13 KG/M2 | TEMPERATURE: 97.3 F | DIASTOLIC BLOOD PRESSURE: 101 MMHG | RESPIRATION RATE: 19 BRPM | HEART RATE: 93 BPM | SYSTOLIC BLOOD PRESSURE: 192 MMHG | WEIGHT: 185 LBS

## 2017-07-05 DIAGNOSIS — C77.9 METASTATIC MELANOMA TO LYMPH NODE (HCC): Primary | ICD-10-CM

## 2017-07-05 PROCEDURE — 99215 OFFICE O/P EST HI 40 MIN: CPT | Performed by: INTERNAL MEDICINE

## 2017-07-05 RX ORDER — OXYCODONE HYDROCHLORIDE AND ACETAMINOPHEN 5; 325 MG/1; MG/1
1 TABLET ORAL EVERY 6 HOURS PRN
Qty: 120 TABLET | Refills: 0 | Status: SHIPPED | OUTPATIENT
Start: 2017-07-05 | End: 2017-01-01 | Stop reason: SDUPTHER

## 2017-07-05 NOTE — PROGRESS NOTES
DATE OF VISIT: 7/5/2017    REASON FOR VISIT: Followup for Metastatic melanoma with spindle cell features      HISTORY OF PRESENT ILLNESS: The patient is a very pleasant 56 y.o. male  who was in his usual state of health until approximately February 2017. The patient presented with mass in his left groin, this has been gradually increasing in size. Patient never had this problem before. This is associated with mild pain get worse with activity and improves with laying flat. Patient went and so his primary care provider who referred him to see his surgeon. Patient so Dr. Wilson on February 28, 2017. Patient had a biopsy done March 3, 2017 that revealed metastatic melanoma.  The patient had CAT scan abdomen and pelvis done February 12, 2017 revealed left inguinal and iliac chain adenopathy.  Unfortunately insurance had declined whole body PET scan as well as MRI brain for staging purposes and there was no option.  The patient was started on Opdivo plus Yervoy on April 12, 2017.  The patient is here today for cycle #4    SUBJECTIVE: The patient has been having 3-5 diarrhea stools every day for the last 3 weeks, he is currently on prednisone 10 mg daily, this had improved over the last 2 days.. he has been complaining of nausea every time he tries to eat, Zofran is not helping. he denied any fever or chills, no night sweats, denied any headaches.  He has diffuse skin rash.  His itching and improves partially with Benadryl.     PAST MEDICAL HISTORY/SOCIAL HISTORY/FAMILY HISTORY: Unchanged from my prior documentation done on March 27 2017    Review of Systems   Constitutional: Negative for activity change, appetite change, chills, fatigue, fever and unexpected weight change.   HENT: Negative for hearing loss, mouth sores, nosebleeds, sore throat and trouble swallowing.    Eyes: Negative for visual disturbance.   Respiratory: Negative for cough, chest tightness, shortness of breath and wheezing.    Cardiovascular: Negative  for chest pain, palpitations and leg swelling.   Gastrointestinal: Negative for abdominal distention, abdominal pain, blood in stool, constipation, diarrhea, nausea, rectal pain and vomiting.   Endocrine: Negative for cold intolerance and heat intolerance.   Genitourinary: Negative for difficulty urinating, dysuria, frequency and urgency.   Musculoskeletal: Negative for arthralgias, back pain, gait problem, joint swelling and myalgias.   Skin: Negative for rash.   Neurological: Negative for dizziness, tremors, syncope, weakness, light-headedness, numbness and headaches.   Hematological: Negative for adenopathy. Does not bruise/bleed easily.   Psychiatric/Behavioral: Negative for confusion, sleep disturbance and suicidal ideas. The patient is not nervous/anxious.          Current Outpatient Prescriptions:   •  atorvastatin (LIPITOR) 20 MG tablet, Take 20 mg by mouth Daily., Disp: , Rfl:   •  diphenoxylate-atropine (LOMOTIL) 2.5-0.025 MG per tablet, Take 1 tablet by mouth Every 6 (Six) Hours As Needed for Diarrhea. 1 tablet, Disp: 30 tablet, Rfl: 5  •  glipiZIDE (GLUCOTROL) 5 MG tablet, Take 5 mg by mouth 2 (Two) Times a Day Before Meals., Disp: , Rfl:   •  hydrOXYzine (ATARAX) 25 MG tablet, Take 25 mg by mouth 3 (Three) Times a Day As Needed for Itching., Disp: , Rfl:   •  lisinopril (PRINIVIL,ZESTRIL) 10 MG tablet, Take 10 mg by mouth Daily., Disp: , Rfl:   •  ondansetron (ZOFRAN) 8 MG tablet, Take 1 tablet by mouth 3 (Three) Times a Day As Needed for Nausea or Vomiting., Disp: 30 tablet, Rfl: 5  •  oxyCODONE-acetaminophen (PERCOCET) 5-325 MG per tablet, Take 1 tablet by mouth Every 6 (Six) Hours As Needed for Moderate Pain (4-6)., Disp: 120 tablet, Rfl: 0  •  predniSONE (DELTASONE) 10 MG tablet, Take 1 tablet by mouth Take As Directed. Take 6 tablets daily for 5 days, and decrease by 1 tablet every 5 days., Disp: 105 tablet, Rfl: 0    PHYSICAL EXAMINATION:   BP (!) 192/101  Pulse 93  Temp 97.3 °F (36.3 °C)  (Temporal Artery )   Resp 19  Wt 185 lb (83.9 kg)  BMI 28.13 kg/m2   ECOG Performance Status: 0 - Asymptomatic  General Appearance:  alert, cooperative, no apparent distress and appears stated age   Neurologic/Psychiatric: A&O x 3, gait steady, appropriate affect, strength 5/5 in all muscle groups   HEENT:  Normocephalic, without obvious abnormality, mucous membranes moist   Neck: Supple, symmetrical, trachea midline, no adenopathy;  No thyromegaly, masses, or tenderness   Lungs:   Clear to auscultation bilaterally; respirations regular, even, and unlabored bilaterally   Heart:  Regular rate and rhythm, no murmurs appreciated   Abdomen:   Soft, non-tender, non-distended and no organomegaly   Lymph nodes: No cervical, supraclavicular, inguinal or axillary adenopathy noted   Extremities: Normal, atraumatic; no clubbing, cyanosis, or edema    Skin: No rashes, ulcers, or suspicious lesions noted     No visits with results within 2 Week(s) from this visit.  Latest known visit with results is:    Infusion on 04/12/2017   Component Date Value Ref Range Status   • Glucose 04/12/2017 255* 74 - 98 mg/dL Final    Glucose >180, Hemoglobin A1C recommended.   • BUN 04/12/2017 8  7 - 20 mg/dL Final   • Creatinine 04/12/2017 0.60  0.60 - 1.30 mg/dL Final   • Sodium 04/12/2017 134* 137 - 145 mmol/L Final   • Potassium 04/12/2017 4.2  3.5 - 5.1 mmol/L Final   • Chloride 04/12/2017 94* 98 - 107 mmol/L Final   • CO2 04/12/2017 32.0* 26.0 - 30.0 mmol/L Final   • Calcium 04/12/2017 9.4  8.4 - 10.2 mg/dL Final   • Total Protein 04/12/2017 8.2  6.3 - 8.2 g/dL Final   • Albumin 04/12/2017 4.20  3.50 - 5.00 g/dL Final   • ALT (SGPT) 04/12/2017 29  13 - 69 U/L Final   • AST (SGOT) 04/12/2017 30  15 - 46 U/L Final   • Alkaline Phosphatase 04/12/2017 154* 38 - 126 U/L Final   • Total Bilirubin 04/12/2017 1.6* 0.2 - 1.3 mg/dL Final   • eGFR Non African Amer 04/12/2017 140  >60 mL/min/1.73 Final   • Globulin 04/12/2017 4.0  gm/dL Final   • A/G  Ratio 04/12/2017 1.1  1.0 - 2.0 g/dL Final   • BUN/Creatinine Ratio 04/12/2017 13.3  6.3 - 21.9 Final   • Anion Gap 04/12/2017 12.2  mmol/L Final   • TSH 04/12/2017 4.240  0.470 - 4.680 mIU/mL Final   • Free T4 04/12/2017 1.25  0.78 - 2.19 ng/dL Final   • Cortisol 04/12/2017 12.0  ug/dL Final                            Cortisol AM         6.2 - 19.4                          Cortisol PM         2.3 - 11.9   • WBC 04/12/2017 8.12  4.80 - 10.80 10*3/mm3 Final   • RBC 04/12/2017 5.47  4.70 - 6.10 10*6/mm3 Final   • Hemoglobin 04/12/2017 17.9  14.0 - 18.0 g/dL Final   • Hematocrit 04/12/2017 50.1  42.0 - 52.0 % Final   • MCV 04/12/2017 91.6  80.0 - 94.0 fL Final   • MCH 04/12/2017 32.7* 27.0 - 31.0 pg Final   • MCHC 04/12/2017 35.7  30.0 - 37.0 g/dL Final   • RDW 04/12/2017 13.0  11.5 - 14.5 % Final   • RDW-SD 04/12/2017 43.7  37.0 - 54.0 fl Final   • MPV 04/12/2017 12.0  6.0 - 12.0 fL Final   • Platelets 04/12/2017 182  130 - 400 10*3/mm3 Final   • Neutrophil % 04/12/2017 72.9  37.0 - 80.0 % Final   • Lymphocyte % 04/12/2017 13.7  10.0 - 50.0 % Final   • Monocyte % 04/12/2017 11.8  0.0 - 12.0 % Final   • Eosinophil % 04/12/2017 0.9  0.0 - 7.0 % Final   • Basophil % 04/12/2017 0.5  0.0 - 2.5 % Final   • Immature Grans % 04/12/2017 0.2  0.0 - 0.6 % Final   • Neutrophils, Absolute 04/12/2017 5.92  2.00 - 6.90 10*3/mm3 Final   • Lymphocytes, Absolute 04/12/2017 1.11  0.60 - 3.40 10*3/mm3 Final   • Monocytes, Absolute 04/12/2017 0.96* 0.00 - 0.90 10*3/mm3 Final   • Eosinophils, Absolute 04/12/2017 0.07  0.00 - 0.70 10*3/mm3 Final   • Basophils, Absolute 04/12/2017 0.04  0.00 - 0.20 10*3/mm3 Final   • Immature Grans, Absolute 04/12/2017 0.02  0.00 - 0.06 10*3/mm3 Final   • nRBC 04/12/2017 0.0  0.0 - 0.0 /100 WBC Final        No results found.    ASSESSMENT: The patient is a very pleasant 56 y.o. male with metastatic melanoma     PROBLEM LIST:   1. Metastatic melanoma diagnosed after ultrasound-guided biopsy left inguinal  lymph node March 2, 2017.  TX NX M1 B.  Disease burden left inguinal and left iliac chain lymphadenopathy.  2. Remote history of melanoma left check and left lower lip 12 and 13 years ago.  3. Started Opdivo plus Yervoy April 12, 2017, status post 3 cycles.  This was stopped secondary to colitis.   4.  We'll start Opdivo single agent July 12, 2017   5.  Treatment induced colitis, grade 3  6.  Treatment induced dermatitis, grade 2  7.  Uncontrolled type 2 diabetes  8 . Cancer related pain  9.  Alcohol abuse    PLAN:  1.  I will hold treatment today secondary to diarrhea and rash.  I will discontinue Opdivo and Yervoy and would proceed with Opdivo single agent next week..  2.  The patient will follow-up with me in 3 weeks for cycle #2.  3. We discussed potential side effects of immunotherapy including but not limited to immune mediated reactions with thyroiditis, pneumonitis, hepatitis, colitis, rash, and electrolytes abnormalities, fatigue, multiorgan failure, and possibly death.  4.  I will order a follow-up CT scans prior to return.  5.  I will do Opdivo maintenance if his next scans looked stable or better.  6.  I will continue Percocet 5/325 mg 6 hours as needed for cancer-related pain.  I will refill it today.  I don't see a reason to go up on the dose since patient mass is smaller.    7.  I'll be monitoring patient blood counts kidney function liver function as well as thyroid function.  8.  I will start the patient on Reglan 5 mg 3 times a day as well as Zofran 8 mg every 6 hours as needed for nausea.  9.   Patient will follow-up with Dr. Chacko for uncontrolled type 2 diabetes.  10.  I will continue the patient on prednisone 10 mg daily.  11.  I will continue the patient on Imodium as well as Lomotil as needed for diarrhea.  12.  I'll continue the patient on Benadryl as needed for itching  Kavitha Martins MD  7/5/2017
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
## 2017-07-10 NOTE — TELEPHONE ENCOUNTER
States symptoms have been going on for 3-4 days, and stools have increased to 7-10x daily.  Discussed with Dr Martins, and advised Rosangela that patient should go to the ER for evaluation  Will cancel appt for infusion tomorrow, and reschedule

## 2017-07-18 NOTE — TELEPHONE ENCOUNTER
Patient/sister Rosangela informed of abnormal thyroid levels via VM. Synthroid 75mcg QD sent to pharmacy for patient

## 2017-07-26 NOTE — PROGRESS NOTES
DATE OF VISIT: 7/26/2017    REASON FOR VISIT: Followup for Metastatic melanoma with spindle cell features      HISTORY OF PRESENT ILLNESS: The patient is a very pleasant 56 y.o. male  who was in his usual state of health until approximately February 2017. The patient presented with mass in his left groin, this has been gradually increasing in size. Patient never had this problem before. This is associated with mild pain get worse with activity and improves with laying flat. Patient went and so his primary care provider who referred him to see his surgeon. Patient so Dr. Wilson on February 28, 2017. Patient had a biopsy done March 3, 2017 that revealed metastatic melanoma.  The patient had CAT scan abdomen and pelvis done February 12, 2017 revealed left inguinal and iliac chain adenopathy.  Unfortunately insurance had declined whole body PET scan as well as MRI brain for staging purposes and there was no option.  The patient was started on Opdivo plus Yervoy on April 12, 2017. He completed 3 cycles and last dose was canceled secondary to colitis, that resolved after a short course of steroids.  He was started on Opdivo single agent July 26, 2017.  The patient is here today for cycle #1.     SUBJECTIVE: The patient diarrhea is significantly better, but is having 1 lose bowel movement a day. he has been complaining of nausea, Zofran is helping. he denied any fever or chills, no night sweats, denied any headaches.  He has diffuse skin rash.  His itching and improves partially with Benadryl.     PAST MEDICAL HISTORY/SOCIAL HISTORY/FAMILY HISTORY: Unchanged from my prior documentation done on March 27 2017    Review of Systems   Constitutional: Negative for activity change, appetite change, chills, fatigue, fever and unexpected weight change.   HENT: Negative for hearing loss, mouth sores, nosebleeds, sore throat and trouble swallowing.    Eyes: Negative for visual disturbance.   Respiratory: Negative for cough, chest  tightness, shortness of breath and wheezing.    Cardiovascular: Negative for chest pain, palpitations and leg swelling.   Gastrointestinal: Negative for abdominal distention, abdominal pain, blood in stool, constipation, diarrhea, nausea, rectal pain and vomiting.   Endocrine: Negative for cold intolerance and heat intolerance.   Genitourinary: Negative for difficulty urinating, dysuria, frequency and urgency.   Musculoskeletal: Negative for arthralgias, back pain, gait problem, joint swelling and myalgias.   Skin: Negative for rash.   Neurological: Negative for dizziness, tremors, syncope, weakness, light-headedness, numbness and headaches.   Hematological: Negative for adenopathy. Does not bruise/bleed easily.   Psychiatric/Behavioral: Negative for confusion, sleep disturbance and suicidal ideas. The patient is not nervous/anxious.          Current Outpatient Prescriptions:   •  atorvastatin (LIPITOR) 20 MG tablet, Take 20 mg by mouth Daily., Disp: , Rfl:   •  diphenoxylate-atropine (LOMOTIL) 2.5-0.025 MG per tablet, Take 1 tablet by mouth Every 6 (Six) Hours As Needed for Diarrhea. 1 tablet, Disp: 30 tablet, Rfl: 5  •  glipiZIDE (GLUCOTROL) 5 MG tablet, Take 5 mg by mouth 2 (Two) Times a Day Before Meals., Disp: , Rfl:   •  hydrOXYzine (ATARAX) 25 MG tablet, Take 25 mg by mouth 3 (Three) Times a Day As Needed for Itching., Disp: , Rfl:   •  levothyroxine (SYNTHROID, LEVOTHROID) 75 MCG tablet, Take 1 tablet by mouth Daily., Disp: 30 tablet, Rfl: 3  •  lisinopril (PRINIVIL,ZESTRIL) 10 MG tablet, Take 10 mg by mouth Daily., Disp: , Rfl:   •  ondansetron (ZOFRAN) 8 MG tablet, Take 1 tablet by mouth 3 (Three) Times a Day As Needed for Nausea or Vomiting., Disp: 30 tablet, Rfl: 5  •  oxyCODONE-acetaminophen (PERCOCET) 5-325 MG per tablet, Take 1 tablet by mouth Every 6 (Six) Hours As Needed for Moderate Pain (4-6)., Disp: 120 tablet, Rfl: 0  •  predniSONE (DELTASONE) 10 MG tablet, Take 1 tablet by mouth Take As  Directed. Take 6 tablets daily for 5 days, and decrease by 1 tablet every 5 days., Disp: 105 tablet, Rfl: 0  No current facility-administered medications for this visit.     Facility-Administered Medications Ordered in Other Visits:   •  nivolumab (OPDIVO) 240 mg in sodium chloride 0.9 % 100 mL chemo IVPB, 240 mg, Intravenous, Once, Andressa Mazariegos, APRN, Last Rate: 134 mL/hr at 07/26/17 1127, 240 mg at 07/26/17 1127    PHYSICAL EXAMINATION:   BP (!) 206/101  Pulse 83  Temp 97 °F (36.1 °C) (Temporal Artery )   Resp 18  Wt 190 lb 1.6 oz (86.2 kg)  BMI 28.9 kg/m2   ECOG Performance Status: 0 - Asymptomatic  General Appearance:  alert, cooperative, no apparent distress and appears stated age   Neurologic/Psychiatric: A&O x 3, gait steady, appropriate affect, strength 5/5 in all muscle groups   HEENT:  Normocephalic, without obvious abnormality, mucous membranes moist   Neck: Supple, symmetrical, trachea midline, no adenopathy;  No thyromegaly, masses, or tenderness   Lungs:   Clear to auscultation bilaterally; respirations regular, even, and unlabored bilaterally   Heart:  Regular rate and rhythm, no murmurs appreciated   Abdomen:   Soft, non-tender, non-distended and no organomegaly   Lymph nodes: No cervical, supraclavicular, inguinal or axillary adenopathy noted   Extremities: Normal, atraumatic; no clubbing, cyanosis, or edema    Skin: No rashes, ulcers, or suspicious lesions noted     Lab on 07/17/2017   Component Date Value Ref Range Status   • Glucose 07/17/2017 258* 70 - 100 mg/dL Final   • BUN 07/17/2017 6* 9 - 23 mg/dL Final   • Creatinine 07/17/2017 0.80  0.60 - 1.30 mg/dL Final   • Sodium 07/17/2017 131* 132 - 146 mmol/L Final   • Potassium 07/17/2017 4.2  3.5 - 5.5 mmol/L Final   • Chloride 07/17/2017 92* 99 - 109 mmol/L Final   • CO2 07/17/2017 28.0  20.0 - 31.0 mmol/L Final   • Calcium 07/17/2017 9.4  8.7 - 10.4 mg/dL Final   • Total Protein 07/17/2017 8.2  5.7 - 8.2 g/dL Final   • Albumin 07/17/2017  3.70  3.20 - 4.80 g/dL Final   • ALT (SGPT) 07/17/2017 52* 7 - 40 U/L Final   • AST (SGOT) 07/17/2017 102* 0 - 33 U/L Final   • Alkaline Phosphatase 07/17/2017 205* 25 - 100 U/L Final   • Total Bilirubin 07/17/2017 1.4* 0.3 - 1.2 mg/dL Final   • eGFR Non African Amer 07/17/2017 101  >60 mL/min/1.73 Final   • Globulin 07/17/2017 4.5  gm/dL Final   • A/G Ratio 07/17/2017 0.8* 1.5 - 2.5 g/dL Final   • BUN/Creatinine Ratio 07/17/2017 7.5  7.0 - 25.0 Final   • Anion Gap 07/17/2017 11.0  3.0 - 11.0 mmol/L Final   • TSH 07/17/2017 48.204* 0.350 - 5.350 mIU/mL Final   • Free T4 07/17/2017 0.37* 0.89 - 1.76 ng/dL Final   • WBC 07/17/2017 6.60  3.50 - 10.80 10*3/mm3 Final   • RBC 07/17/2017 4.97  4.20 - 5.76 10*6/mm3 Final   • Hemoglobin 07/17/2017 16.6  13.1 - 17.5 g/dL Final   • Hematocrit 07/17/2017 48.9  38.9 - 50.9 % Final   • MCV 07/17/2017 98.4  80.0 - 99.0 fL Final   • MCH 07/17/2017 33.4* 27.0 - 31.0 pg Final   • MCHC 07/17/2017 33.9  32.0 - 36.0 g/dL Final   • RDW 07/17/2017 14.9* 11.3 - 14.5 % Final   • RDW-SD 07/17/2017 53.6  37.0 - 54.0 fl Final   • MPV 07/17/2017 10.6  6.0 - 12.0 fL Final   • Platelets 07/17/2017 123* 150 - 450 10*3/mm3 Final   • Neutrophil % 07/17/2017 72.9* 41.0 - 71.0 % Final   • Lymphocyte % 07/17/2017 18.6* 24.0 - 44.0 % Final   • Monocyte % 07/17/2017 6.1  0.0 - 12.0 % Final   • Eosinophil % 07/17/2017 1.4  0.0 - 3.0 % Final   • Basophil % 07/17/2017 0.5  0.0 - 1.0 % Final   • Immature Grans % 07/17/2017 0.5  0.0 - 0.6 % Final   • Neutrophils, Absolute 07/17/2017 4.82  1.50 - 8.30 10*3/mm3 Final   • Lymphocytes, Absolute 07/17/2017 1.23  0.60 - 4.80 10*3/mm3 Final   • Monocytes, Absolute 07/17/2017 0.40  0.00 - 1.00 10*3/mm3 Final   • Eosinophils, Absolute 07/17/2017 0.09  0.00 - 0.30 10*3/mm3 Final   • Basophils, Absolute 07/17/2017 0.03  0.00 - 0.20 10*3/mm3 Final   • Immature Grans, Absolute 07/17/2017 0.03  0.00 - 0.03 10*3/mm3 Final   Hospital Outpatient Visit on 07/17/2017    Component Date Value Ref Range Status   • Creatinine 07/17/2017 0.80  0.60 - 1.30 mg/dL Final    Serial Number: [id number]:  [id number]        Ct Chest With Contrast    Result Date: 7/18/2017  Narrative: EXAMINATION: CT CHEST W CONTRAST, CT ABDOMEN AND PELVIS W CONTRAST-07/17/2017:  INDICATION: F/U scan; C77.9-Secondary and unspecified malignant neoplasm of lymph node, unspecified.     TECHNIQUE: CT of the chest, abdomen and pelvis with intravenous contrast administration.  The radiation dose reduction device was turned on for each scan per the ALARA (As Low as Reasonably Achievable) protocol.  COMPARISON: NONE.  FINDINGS:  CHEST: Thyroid is homogeneous in attenuation. Mediastinum is without suspicious mass lesion or adenopathy. Calcified hilar lymph nodes consistent with prior granulomatous involvement, greatest on the right. No axillary adenopathy. Normal heart size. No pericardial effusion. Coronary artery and thoracic aortic calcifications. Lung windows demonstrate no suspicious pulmonary nodule or mass. Minimal subsegmental dependent atelectasis.  ABDOMEN/PELVIS: Diffuse low attenuation of the liver parenchyma consistent with hepatic steatosis. Subcentimeter indeterminate low-attenuation lesion right hepatic lobe series 2, image 47. Gallbladder unremarkable. Pancreas without suspicious lesion. Adrenals normal morphology without nodule. Kidneys are normal in position without hydronephrosis, contour deforming mass or calculi. Atherosclerotic nonaneurysmal abdominal aorta. IVC and portal veins are patent.  Pelvic viscera unremarkable with moderately distended urinary bladder. Calcified phleboliths. Bowel is without focal thickening or disproportionally dilated loops. No free fluid. No retroperitoneal lymphadenopathy. Within the left superficial inguinal basin there is an enlarged abnormal appearing 2 cm lymph node series 2, image 121 with surrounding fat stranding and reticulation as well as multiple other  abnormal appearing and mildly enlarged lymph nodes in this region.      Impression: 1. No evidence of acute abnormality or metastatic involvement of the chest. 2. Abnormal appearing enlarged left superficial inguinal lymph node with surrounding stranding and adjacent lymphadenopathy concerning for metastatic adenopathy.  D:  07/18/2017 E:  07/18/2017  This report was finalized on 7/18/2017 4:34 PM by Dr. Prosper Cline.      Ct Abdomen Pelvis With Contrast    Result Date: 7/18/2017  Narrative: EXAMINATION: CT CHEST W CONTRAST, CT ABDOMEN AND PELVIS W CONTRAST-07/17/2017:  INDICATION: F/U scan; C77.9-Secondary and unspecified malignant neoplasm of lymph node, unspecified.     TECHNIQUE: CT of the chest, abdomen and pelvis with intravenous contrast administration.  The radiation dose reduction device was turned on for each scan per the ALARA (As Low as Reasonably Achievable) protocol.  COMPARISON: NONE.  FINDINGS:  CHEST: Thyroid is homogeneous in attenuation. Mediastinum is without suspicious mass lesion or adenopathy. Calcified hilar lymph nodes consistent with prior granulomatous involvement, greatest on the right. No axillary adenopathy. Normal heart size. No pericardial effusion. Coronary artery and thoracic aortic calcifications. Lung windows demonstrate no suspicious pulmonary nodule or mass. Minimal subsegmental dependent atelectasis.  ABDOMEN/PELVIS: Diffuse low attenuation of the liver parenchyma consistent with hepatic steatosis. Subcentimeter indeterminate low-attenuation lesion right hepatic lobe series 2, image 47. Gallbladder unremarkable. Pancreas without suspicious lesion. Adrenals normal morphology without nodule. Kidneys are normal in position without hydronephrosis, contour deforming mass or calculi. Atherosclerotic nonaneurysmal abdominal aorta. IVC and portal veins are patent.  Pelvic viscera unremarkable with moderately distended urinary bladder. Calcified phleboliths. Bowel is without focal  thickening or disproportionally dilated loops. No free fluid. No retroperitoneal lymphadenopathy. Within the left superficial inguinal basin there is an enlarged abnormal appearing 2 cm lymph node series 2, image 121 with surrounding fat stranding and reticulation as well as multiple other abnormal appearing and mildly enlarged lymph nodes in this region.      Impression: 1. No evidence of acute abnormality or metastatic involvement of the chest. 2. Abnormal appearing enlarged left superficial inguinal lymph node with surrounding stranding and adjacent lymphadenopathy concerning for metastatic adenopathy.  D:  07/18/2017 E:  07/18/2017  This report was finalized on 7/18/2017 4:34 PM by Dr. Prosper Cline.        ASSESSMENT: The patient is a very pleasant 56 y.o. male with metastatic melanoma     PROBLEM LIST:   1. Metastatic melanoma diagnosed after ultrasound-guided biopsy left inguinal lymph node March 2, 2017.  TX NX M1 B.  Disease burden left inguinal and left iliac chain lymphadenopathy.  2. Remote history of melanoma left check and left lower lip 12 and 13 years ago.  3. Started Opdivo plus Yervoy April 12, 2017, status post 3 cycles.  This was stopped secondary to colitis.   4.  Started Opdivo single agent July 26, 2017   5.  Treatment induced colitis, grade 3, resolved  6.  Treatment induced dermatitis, grade 2  7.  Uncontrolled type 2 diabetes  8 . Cancer related pain  9.  Alcohol abuse    PLAN:  1.  I will proceed with Opdivo single agent as scheduled today.  2.  The patient will follow-up with me in 2 weeks for cycle #2.  3. We discussed potential side effects of immunotherapy including but not limited to immune mediated reactions with thyroiditis, pneumonitis, hepatitis, colitis, rash, and electrolytes abnormalities, fatigue, multiorgan failure, and possibly death.  4.  I did go over the CAT scan results with the patient and his sister a shortness having response to treatment with no new disease as well as  partial reduction of his left iliac chain lymph node.  I will order a follow-up CT scans in 3 month, this would be due October 18, 2017.  5.  I will continue Percocet 5/325 mg 6 hours as needed for cancer-related pain.  I don't see a reason to go up on the dose since patient mass is smaller.    6.  I'll be monitoring patient blood counts kidney function liver function as well as thyroid function.  7.  I will continue the patient on Reglan 5 mg 3 times a day as well as Zofran 8 mg every 6 hours as needed for nausea.  8.   Patient will follow-up with Dr. Chacko for uncontrolled type 2 diabetes.  9.  I will continue the patient on Imodium as well as Lomotil as needed for diarrhea.  10.  I'll continue the patient on Benadryl as needed for itching  Kavitha Martins MD  7/26/2017

## 2017-08-09 NOTE — PROGRESS NOTES
DATE OF VISIT: 8/9/2017    REASON FOR VISIT: Followup for Metastatic melanoma with spindle cell features      HISTORY OF PRESENT ILLNESS: The patient is a very pleasant 56 y.o. male  who was in his usual state of health until approximately February 2017. The patient presented with mass in his left groin, this has been gradually increasing in size. Patient never had this problem before. This is associated with mild pain get worse with activity and improves with laying flat. Patient went and so his primary care provider who referred him to see his surgeon. Patient so Dr. Wilson on February 28, 2017. Patient had a biopsy done March 3, 2017 that revealed metastatic melanoma.  The patient had CAT scan abdomen and pelvis done February 12, 2017 revealed left inguinal and iliac chain adenopathy.  Unfortunately insurance had declined whole body PET scan as well as MRI brain for staging purposes and there was no option.  The patient was started on Opdivo plus Yervoy on April 12, 2017. He completed 3 cycles and last dose was canceled secondary to colitis, that resolved after a short course of steroids.  He was started on Opdivo single agent July 26, 2017.  The patient is here today for cycle #2.     SUBJECTIVE: The patient is complaining of fatigue.  He is complaining of nausea no vomiting, he is requesting refill on Zofran.  His pain is fairly controlled, he is requesting a refill on this medicine..  His itching and improves partially with Benadryl.     PAST MEDICAL HISTORY/SOCIAL HISTORY/FAMILY HISTORY: Unchanged from my prior documentation done on March 27 2017    Review of Systems   Constitutional: Negative for activity change, appetite change, chills, fatigue, fever and unexpected weight change.   HENT: Negative for hearing loss, mouth sores, nosebleeds, sore throat and trouble swallowing.    Eyes: Negative for visual disturbance.   Respiratory: Negative for cough, chest tightness, shortness of breath and wheezing.     Cardiovascular: Negative for chest pain, palpitations and leg swelling.   Gastrointestinal: Negative for abdominal distention, abdominal pain, blood in stool, constipation, diarrhea, nausea, rectal pain and vomiting.   Endocrine: Negative for cold intolerance and heat intolerance.   Genitourinary: Negative for difficulty urinating, dysuria, frequency and urgency.   Musculoskeletal: Negative for arthralgias, back pain, gait problem, joint swelling and myalgias.   Skin: Negative for rash.   Neurological: Negative for dizziness, tremors, syncope, weakness, light-headedness, numbness and headaches.   Hematological: Negative for adenopathy. Does not bruise/bleed easily.   Psychiatric/Behavioral: Negative for confusion, sleep disturbance and suicidal ideas. The patient is not nervous/anxious.          Current Outpatient Prescriptions:   •  atorvastatin (LIPITOR) 20 MG tablet, Take 20 mg by mouth Daily., Disp: , Rfl:   •  diphenoxylate-atropine (LOMOTIL) 2.5-0.025 MG per tablet, Take 1 tablet by mouth Every 6 (Six) Hours As Needed for Diarrhea. 1 tablet, Disp: 30 tablet, Rfl: 5  •  glipiZIDE (GLUCOTROL) 5 MG tablet, Take 5 mg by mouth 2 (Two) Times a Day Before Meals., Disp: , Rfl:   •  hydrOXYzine (ATARAX) 25 MG tablet, Take 25 mg by mouth 3 (Three) Times a Day As Needed for Itching., Disp: , Rfl:   •  levothyroxine (SYNTHROID, LEVOTHROID) 75 MCG tablet, Take 1 tablet by mouth Daily., Disp: 30 tablet, Rfl: 3  •  lisinopril (PRINIVIL,ZESTRIL) 10 MG tablet, Take 10 mg by mouth Daily., Disp: , Rfl:   •  ondansetron (ZOFRAN) 8 MG tablet, Take 1 tablet by mouth 3 (Three) Times a Day As Needed for Nausea or Vomiting., Disp: 30 tablet, Rfl: 5  •  oxyCODONE-acetaminophen (PERCOCET) 5-325 MG per tablet, Take 1 tablet by mouth Every 6 (Six) Hours As Needed for Moderate Pain (4-6)., Disp: 120 tablet, Rfl: 0  •  predniSONE (DELTASONE) 10 MG tablet, Take 1 tablet by mouth Take As Directed. Take 6 tablets daily for 5 days, and  decrease by 1 tablet every 5 days., Disp: 105 tablet, Rfl: 0    PHYSICAL EXAMINATION:   BP (!) 198/95  Pulse 78  Temp 97.1 °F (36.2 °C) (Temporal Artery )   Resp 21  Wt 190 lb (86.2 kg)  BMI 28.89 kg/m2   ECOG Performance Status: 0 - Asymptomatic  General Appearance:  alert, cooperative, no apparent distress and appears stated age   Neurologic/Psychiatric: A&O x 3, gait steady, appropriate affect, strength 5/5 in all muscle groups   HEENT:  Normocephalic, without obvious abnormality, mucous membranes moist   Neck: Supple, symmetrical, trachea midline, no adenopathy;  No thyromegaly, masses, or tenderness   Lungs:   Clear to auscultation bilaterally; respirations regular, even, and unlabored bilaterally   Heart:  Regular rate and rhythm, no murmurs appreciated   Abdomen:   Soft, non-tender, non-distended and no organomegaly   Lymph nodes: No cervical, supraclavicular, inguinal or axillary adenopathy noted   Extremities: Normal, atraumatic; no clubbing, cyanosis, or edema    Skin: No rashes, ulcers, or suspicious lesions noted     No visits with results within 2 Week(s) from this visit.  Latest known visit with results is:    Lab on 07/17/2017   Component Date Value Ref Range Status   • Glucose 07/17/2017 258* 70 - 100 mg/dL Final   • BUN 07/17/2017 6* 9 - 23 mg/dL Final   • Creatinine 07/17/2017 0.80  0.60 - 1.30 mg/dL Final   • Sodium 07/17/2017 131* 132 - 146 mmol/L Final   • Potassium 07/17/2017 4.2  3.5 - 5.5 mmol/L Final   • Chloride 07/17/2017 92* 99 - 109 mmol/L Final   • CO2 07/17/2017 28.0  20.0 - 31.0 mmol/L Final   • Calcium 07/17/2017 9.4  8.7 - 10.4 mg/dL Final   • Total Protein 07/17/2017 8.2  5.7 - 8.2 g/dL Final   • Albumin 07/17/2017 3.70  3.20 - 4.80 g/dL Final   • ALT (SGPT) 07/17/2017 52* 7 - 40 U/L Final   • AST (SGOT) 07/17/2017 102* 0 - 33 U/L Final   • Alkaline Phosphatase 07/17/2017 205* 25 - 100 U/L Final   • Total Bilirubin 07/17/2017 1.4* 0.3 - 1.2 mg/dL Final   • eGFR Non African  Amer 07/17/2017 101  >60 mL/min/1.73 Final   • Globulin 07/17/2017 4.5  gm/dL Final   • A/G Ratio 07/17/2017 0.8* 1.5 - 2.5 g/dL Final   • BUN/Creatinine Ratio 07/17/2017 7.5  7.0 - 25.0 Final   • Anion Gap 07/17/2017 11.0  3.0 - 11.0 mmol/L Final   • TSH 07/17/2017 48.204* 0.350 - 5.350 mIU/mL Final   • Free T4 07/17/2017 0.37* 0.89 - 1.76 ng/dL Final   • WBC 07/17/2017 6.60  3.50 - 10.80 10*3/mm3 Final   • RBC 07/17/2017 4.97  4.20 - 5.76 10*6/mm3 Final   • Hemoglobin 07/17/2017 16.6  13.1 - 17.5 g/dL Final   • Hematocrit 07/17/2017 48.9  38.9 - 50.9 % Final   • MCV 07/17/2017 98.4  80.0 - 99.0 fL Final   • MCH 07/17/2017 33.4* 27.0 - 31.0 pg Final   • MCHC 07/17/2017 33.9  32.0 - 36.0 g/dL Final   • RDW 07/17/2017 14.9* 11.3 - 14.5 % Final   • RDW-SD 07/17/2017 53.6  37.0 - 54.0 fl Final   • MPV 07/17/2017 10.6  6.0 - 12.0 fL Final   • Platelets 07/17/2017 123* 150 - 450 10*3/mm3 Final   • Neutrophil % 07/17/2017 72.9* 41.0 - 71.0 % Final   • Lymphocyte % 07/17/2017 18.6* 24.0 - 44.0 % Final   • Monocyte % 07/17/2017 6.1  0.0 - 12.0 % Final   • Eosinophil % 07/17/2017 1.4  0.0 - 3.0 % Final   • Basophil % 07/17/2017 0.5  0.0 - 1.0 % Final   • Immature Grans % 07/17/2017 0.5  0.0 - 0.6 % Final   • Neutrophils, Absolute 07/17/2017 4.82  1.50 - 8.30 10*3/mm3 Final   • Lymphocytes, Absolute 07/17/2017 1.23  0.60 - 4.80 10*3/mm3 Final   • Monocytes, Absolute 07/17/2017 0.40  0.00 - 1.00 10*3/mm3 Final   • Eosinophils, Absolute 07/17/2017 0.09  0.00 - 0.30 10*3/mm3 Final   • Basophils, Absolute 07/17/2017 0.03  0.00 - 0.20 10*3/mm3 Final   • Immature Grans, Absolute 07/17/2017 0.03  0.00 - 0.03 10*3/mm3 Final        Ct Chest With Contrast    Result Date: 7/18/2017  Narrative: EXAMINATION: CT CHEST W CONTRAST, CT ABDOMEN AND PELVIS W CONTRAST-07/17/2017:  INDICATION: F/U scan; C77.9-Secondary and unspecified malignant neoplasm of lymph node, unspecified.     TECHNIQUE: CT of the chest, abdomen and pelvis with  intravenous contrast administration.  The radiation dose reduction device was turned on for each scan per the ALARA (As Low as Reasonably Achievable) protocol.  COMPARISON: NONE.  FINDINGS:  CHEST: Thyroid is homogeneous in attenuation. Mediastinum is without suspicious mass lesion or adenopathy. Calcified hilar lymph nodes consistent with prior granulomatous involvement, greatest on the right. No axillary adenopathy. Normal heart size. No pericardial effusion. Coronary artery and thoracic aortic calcifications. Lung windows demonstrate no suspicious pulmonary nodule or mass. Minimal subsegmental dependent atelectasis.  ABDOMEN/PELVIS: Diffuse low attenuation of the liver parenchyma consistent with hepatic steatosis. Subcentimeter indeterminate low-attenuation lesion right hepatic lobe series 2, image 47. Gallbladder unremarkable. Pancreas without suspicious lesion. Adrenals normal morphology without nodule. Kidneys are normal in position without hydronephrosis, contour deforming mass or calculi. Atherosclerotic nonaneurysmal abdominal aorta. IVC and portal veins are patent.  Pelvic viscera unremarkable with moderately distended urinary bladder. Calcified phleboliths. Bowel is without focal thickening or disproportionally dilated loops. No free fluid. No retroperitoneal lymphadenopathy. Within the left superficial inguinal basin there is an enlarged abnormal appearing 2 cm lymph node series 2, image 121 with surrounding fat stranding and reticulation as well as multiple other abnormal appearing and mildly enlarged lymph nodes in this region.      Impression: 1. No evidence of acute abnormality or metastatic involvement of the chest. 2. Abnormal appearing enlarged left superficial inguinal lymph node with surrounding stranding and adjacent lymphadenopathy concerning for metastatic adenopathy.  D:  07/18/2017 E:  07/18/2017  This report was finalized on 7/18/2017 4:34 PM by Dr. Prosper Cline.      Ct Abdomen Pelvis  With Contrast    Result Date: 7/18/2017  Narrative: EXAMINATION: CT CHEST W CONTRAST, CT ABDOMEN AND PELVIS W CONTRAST-07/17/2017:  INDICATION: F/U scan; C77.9-Secondary and unspecified malignant neoplasm of lymph node, unspecified.     TECHNIQUE: CT of the chest, abdomen and pelvis with intravenous contrast administration.  The radiation dose reduction device was turned on for each scan per the ALARA (As Low as Reasonably Achievable) protocol.  COMPARISON: NONE.  FINDINGS:  CHEST: Thyroid is homogeneous in attenuation. Mediastinum is without suspicious mass lesion or adenopathy. Calcified hilar lymph nodes consistent with prior granulomatous involvement, greatest on the right. No axillary adenopathy. Normal heart size. No pericardial effusion. Coronary artery and thoracic aortic calcifications. Lung windows demonstrate no suspicious pulmonary nodule or mass. Minimal subsegmental dependent atelectasis.  ABDOMEN/PELVIS: Diffuse low attenuation of the liver parenchyma consistent with hepatic steatosis. Subcentimeter indeterminate low-attenuation lesion right hepatic lobe series 2, image 47. Gallbladder unremarkable. Pancreas without suspicious lesion. Adrenals normal morphology without nodule. Kidneys are normal in position without hydronephrosis, contour deforming mass or calculi. Atherosclerotic nonaneurysmal abdominal aorta. IVC and portal veins are patent.  Pelvic viscera unremarkable with moderately distended urinary bladder. Calcified phleboliths. Bowel is without focal thickening or disproportionally dilated loops. No free fluid. No retroperitoneal lymphadenopathy. Within the left superficial inguinal basin there is an enlarged abnormal appearing 2 cm lymph node series 2, image 121 with surrounding fat stranding and reticulation as well as multiple other abnormal appearing and mildly enlarged lymph nodes in this region.      Impression: 1. No evidence of acute abnormality or metastatic involvement of the chest.  2. Abnormal appearing enlarged left superficial inguinal lymph node with surrounding stranding and adjacent lymphadenopathy concerning for metastatic adenopathy.  D:  07/18/2017 E:  07/18/2017  This report was finalized on 7/18/2017 4:34 PM by Dr. Prosper Cline.        ASSESSMENT: The patient is a very pleasant 56 y.o. male with metastatic melanoma     PROBLEM LIST:   1. Metastatic melanoma diagnosed after ultrasound-guided biopsy left inguinal lymph node March 2, 2017.  TX NX M1 B.  Disease burden left inguinal and left iliac chain lymphadenopathy.  2. Remote history of melanoma left check and left lower lip 12 and 13 years ago.  3. Started Opdivo plus Yervoy April 12, 2017, status post 3 cycles.  This was stopped secondary to colitis.   4.  Started Opdivo single agent July 26, 2017 status post 1 cycle  5.  Treatment induced colitis, grade 3, resolved  6.  Treatment induced dermatitis, grade 2  7.  Uncontrolled type 2 diabetes  8 . Cancer related pain  9.  Alcohol abuse  10.  Hepatitis    PLAN:  1.  I will hold treatment today secondary to elevated liver enzymes.  This continues by immunotherapy versus alcohol versus Tylenol.  2.  The patient will follow-up with me in 2 weeks for cycle #2.  I will treat him only if his liver enzymes are better.  If his liver enzymes are worse, he would be started on steroids.  3. We discussed potential side effects of immunotherapy including but not limited to immune mediated reactions with thyroiditis, pneumonitis, hepatitis, colitis, rash, and electrolytes abnormalities, fatigue, multiorgan failure, and possibly death.  4.  I did go over the blood work results with the patient and his and sister.    5.  I will change his Percocet 5/325 mg oxycodone 5 mg every 6 hours as needed for cancer-related pain.  I was given a new prescription today.  6.  I'll be monitoring patient blood counts kidney function liver function as well as thyroid function.  7.  I will continue the patient on  Reglan 5 mg 3 times a day as well as Zofran 8 mg every 6 hours as needed for nausea.  8.   Patient will follow-up with Dr. Chacko for uncontrolled type 2 diabetes.  9.  I will continue the patient on Imodium as well as Lomotil as needed for diarrhea.  10.  I'll continue the patient on Benadryl as needed for itching  11.  I will start patient on Zofran as needed for nausea.    Kavitha Martins MD  8/9/2017

## 2017-10-11 NOTE — TELEPHONE ENCOUNTER
Rosangela informed of abnormal thyroid function. Synthroid sent to pharmacy for patient to take 50mg daily for 7 days, then increase to 75mg daily

## 2017-10-11 NOTE — PROGRESS NOTES
DATE OF VISIT: 10/11/2017    REASON FOR VISIT: Followup for Metastatic melanoma with spindle cell features      HISTORY OF PRESENT ILLNESS: The patient is a very pleasant 57 y.o. male  who was in his usual state of health until approximately February 2017. The patient presented with mass in his left groin, this has been gradually increasing in size. Patient never had this problem before. This is associated with mild pain get worse with activity and improves with laying flat. Patient went and so his primary care provider who referred him to see his surgeon. Patient so Dr. Wilson on February 28, 2017. Patient had a biopsy done March 3, 2017 that revealed metastatic melanoma.  The patient had CAT scan abdomen and pelvis done February 12, 2017 revealed left inguinal and iliac chain adenopathy.  Unfortunately insurance had declined whole body PET scan as well as MRI brain for staging purposes and there was no option.  The patient was started on Opdivo plus Yervoy on April 12, 2017. He completed 3 cycles and last dose was canceled secondary to colitis, that resolved after a short course of steroids.  He was started on Opdivo single agent July 26, 2017.  The patient is here today for cycle #2.     SUBJECTIVE: The patient had lost follow-up over the last 2 months.  Has been depressed since his brother got extremely ill.  He is complaining of diffuse joint pain.  He is having new skin lesions showing up on his arms and legs. He is complaining of fatigue.  He is complaining of nausea no vomiting, he is requesting refill on Zofran.  His  is requesting a refill on pain medicine.  His itching and improves partially with Benadryl.     PAST MEDICAL HISTORY/SOCIAL HISTORY/FAMILY HISTORY: Unchanged from my prior documentation done on March 27 2017    Review of Systems   Constitutional: Negative for activity change, appetite change, chills, fatigue, fever and unexpected weight change.   HENT: Negative for hearing loss, mouth sores,  nosebleeds, sore throat and trouble swallowing.    Eyes: Negative for visual disturbance.   Respiratory: Negative for cough, chest tightness, shortness of breath and wheezing.    Cardiovascular: Negative for chest pain, palpitations and leg swelling.   Gastrointestinal: Negative for abdominal distention, abdominal pain, blood in stool, constipation, diarrhea, nausea, rectal pain and vomiting.   Endocrine: Negative for cold intolerance and heat intolerance.   Genitourinary: Negative for difficulty urinating, dysuria, frequency and urgency.   Musculoskeletal: Negative for arthralgias, back pain, gait problem, joint swelling and myalgias.   Skin: Negative for rash.   Neurological: Negative for dizziness, tremors, syncope, weakness, light-headedness, numbness and headaches.   Hematological: Negative for adenopathy. Does not bruise/bleed easily.   Psychiatric/Behavioral: Negative for confusion, sleep disturbance and suicidal ideas. The patient is not nervous/anxious.          Current Outpatient Prescriptions:   •  atorvastatin (LIPITOR) 20 MG tablet, Take 20 mg by mouth Daily., Disp: , Rfl:   •  diphenoxylate-atropine (LOMOTIL) 2.5-0.025 MG per tablet, Take 1 tablet by mouth Every 6 (Six) Hours As Needed for Diarrhea. 1 tablet, Disp: 30 tablet, Rfl: 5  •  glipiZIDE (GLUCOTROL) 5 MG tablet, Take 5 mg by mouth 2 (Two) Times a Day Before Meals., Disp: , Rfl:   •  hydrOXYzine (ATARAX) 25 MG tablet, Take 25 mg by mouth 3 (Three) Times a Day As Needed for Itching., Disp: , Rfl:   •  levothyroxine (SYNTHROID, LEVOTHROID) 75 MCG tablet, Take 1 tablet by mouth Daily., Disp: 30 tablet, Rfl: 3  •  lisinopril (PRINIVIL,ZESTRIL) 10 MG tablet, Take 10 mg by mouth Daily., Disp: , Rfl:   •  ondansetron (ZOFRAN) 8 MG tablet, Take 1 tablet by mouth 3 (Three) Times a Day As Needed for Nausea or Vomiting., Disp: 30 tablet, Rfl: 5  •  oxyCODONE (ROXICODONE) 5 MG immediate release tablet, Take 1 tablet by mouth Every 6 (Six) Hours As Needed  for Moderate Pain (4-6)., Disp: 120 tablet, Rfl: 0  •  oxyCODONE-acetaminophen (PERCOCET) 5-325 MG per tablet, Take 1 tablet by mouth Every 6 (Six) Hours As Needed for Moderate Pain (4-6)., Disp: 120 tablet, Rfl: 0  •  predniSONE (DELTASONE) 10 MG tablet, Take 1 tablet by mouth Take As Directed. Take 6 tablets daily for 5 days, and decrease by 1 tablet every 5 days., Disp: 105 tablet, Rfl: 0    PHYSICAL EXAMINATION:   There were no vitals taken for this visit.   ECOG Performance Status: 0 - Asymptomatic  General Appearance:  alert, cooperative, no apparent distress and appears stated age   Neurologic/Psychiatric: A&O x 3, gait steady, appropriate affect, strength 5/5 in all muscle groups   HEENT:  Normocephalic, without obvious abnormality, mucous membranes moist   Neck: Supple, symmetrical, trachea midline, no adenopathy;  No thyromegaly, masses, or tenderness   Lungs:   Clear to auscultation bilaterally; respirations regular, even, and unlabored bilaterally   Heart:  Regular rate and rhythm, no murmurs appreciated   Abdomen:   Soft, non-tender, non-distended and no organomegaly   Lymph nodes: No cervical, supraclavicular, inguinal or axillary adenopathy noted   Extremities: Normal, atraumatic; no clubbing, cyanosis, or edema    Skin: No rashes, ulcers, or suspicious lesions noted     No visits with results within 2 Week(s) from this visit.  Latest known visit with results is:    Lab on 07/17/2017   Component Date Value Ref Range Status   • Glucose 07/17/2017 258* 70 - 100 mg/dL Final   • BUN 07/17/2017 6* 9 - 23 mg/dL Final   • Creatinine 07/17/2017 0.80  0.60 - 1.30 mg/dL Final   • Sodium 07/17/2017 131* 132 - 146 mmol/L Final   • Potassium 07/17/2017 4.2  3.5 - 5.5 mmol/L Final   • Chloride 07/17/2017 92* 99 - 109 mmol/L Final   • CO2 07/17/2017 28.0  20.0 - 31.0 mmol/L Final   • Calcium 07/17/2017 9.4  8.7 - 10.4 mg/dL Final   • Total Protein 07/17/2017 8.2  5.7 - 8.2 g/dL Final   • Albumin 07/17/2017 3.70   3.20 - 4.80 g/dL Final   • ALT (SGPT) 07/17/2017 52* 7 - 40 U/L Final   • AST (SGOT) 07/17/2017 102* 0 - 33 U/L Final   • Alkaline Phosphatase 07/17/2017 205* 25 - 100 U/L Final   • Total Bilirubin 07/17/2017 1.4* 0.3 - 1.2 mg/dL Final   • eGFR Non African Amer 07/17/2017 101  >60 mL/min/1.73 Final   • Globulin 07/17/2017 4.5  gm/dL Final   • A/G Ratio 07/17/2017 0.8* 1.5 - 2.5 g/dL Final   • BUN/Creatinine Ratio 07/17/2017 7.5  7.0 - 25.0 Final   • Anion Gap 07/17/2017 11.0  3.0 - 11.0 mmol/L Final   • TSH 07/17/2017 48.204* 0.350 - 5.350 mIU/mL Final   • Free T4 07/17/2017 0.37* 0.89 - 1.76 ng/dL Final   • WBC 07/17/2017 6.60  3.50 - 10.80 10*3/mm3 Final   • RBC 07/17/2017 4.97  4.20 - 5.76 10*6/mm3 Final   • Hemoglobin 07/17/2017 16.6  13.1 - 17.5 g/dL Final   • Hematocrit 07/17/2017 48.9  38.9 - 50.9 % Final   • MCV 07/17/2017 98.4  80.0 - 99.0 fL Final   • MCH 07/17/2017 33.4* 27.0 - 31.0 pg Final   • MCHC 07/17/2017 33.9  32.0 - 36.0 g/dL Final   • RDW 07/17/2017 14.9* 11.3 - 14.5 % Final   • RDW-SD 07/17/2017 53.6  37.0 - 54.0 fl Final   • MPV 07/17/2017 10.6  6.0 - 12.0 fL Final   • Platelets 07/17/2017 123* 150 - 450 10*3/mm3 Final   • Neutrophil % 07/17/2017 72.9* 41.0 - 71.0 % Final   • Lymphocyte % 07/17/2017 18.6* 24.0 - 44.0 % Final   • Monocyte % 07/17/2017 6.1  0.0 - 12.0 % Final   • Eosinophil % 07/17/2017 1.4  0.0 - 3.0 % Final   • Basophil % 07/17/2017 0.5  0.0 - 1.0 % Final   • Immature Grans % 07/17/2017 0.5  0.0 - 0.6 % Final   • Neutrophils, Absolute 07/17/2017 4.82  1.50 - 8.30 10*3/mm3 Final   • Lymphocytes, Absolute 07/17/2017 1.23  0.60 - 4.80 10*3/mm3 Final   • Monocytes, Absolute 07/17/2017 0.40  0.00 - 1.00 10*3/mm3 Final   • Eosinophils, Absolute 07/17/2017 0.09  0.00 - 0.30 10*3/mm3 Final   • Basophils, Absolute 07/17/2017 0.03  0.00 - 0.20 10*3/mm3 Final   • Immature Grans, Absolute 07/17/2017 0.03  0.00 - 0.03 10*3/mm3 Final        No results found.    ASSESSMENT: The patient is a  very pleasant 56 y.o. male with metastatic melanoma     PROBLEM LIST:   1. Metastatic melanoma diagnosed after ultrasound-guided biopsy left inguinal lymph node March 2, 2017.  TX NX M1 B.  Disease burden left inguinal and left iliac chain lymphadenopathy.  2. Remote history of melanoma left check and left lower lip 12 and 13 years ago.  3. Started Opdivo plus Yervoy April 12, 2017, status post 3 cycles.  This was stopped secondary to colitis.   4.  Started Opdivo single agent July 26, 2017 status post 1 cycle  5.  Treatment induced colitis, grade 3, resolved  6.  Treatment induced dermatitis, grade 2  7.  Uncontrolled type 2 diabetes  8 . Cancer related pain  9.  Alcohol abuse  10.  Hepatitis    PLAN:  1.  I will repeat patient blood work today for a CBC liver enzymes and thyroid function.  2.  The patient would be treated with cycle #2 tomorrow if his liver enzymes are back to normal.    3.  We discussed potential side effects of immunotherapy including but not limited to immune mediated reactions with thyroiditis, pneumonitis, hepatitis, colitis, rash, and electrolytes abnormalities, fatigue, multiorgan failure, and possibly death.  4.  I will refill his Percocet 5/325 mg oxycodone 5 mg every 6 hours as needed for cancer-related pain.  I was given a new prescription today.  5.  The patient follow with me in 2 weeks for cycle #3.  6.  I'll be monitoring patient blood counts kidney function liver function as well as thyroid function.  7.  I will continue the patient on Reglan 5 mg 3 times a day as well as Zofran 8 mg every 6 hours as needed for nausea.  8.   Patient will follow-up with Dr. Chacko for uncontrolled type 2 diabetes.  9.  I will continue the patient on Imodium as well as Lomotil as needed for diarrhea.  10.  I'll continue the patient on Benadryl as needed for itching  11.  I will continue patient on Zofran as needed for nausea.  12.  I will do a repeat CAT scan after cycle #4.    Kavitha Martins  MD  10/11/2017

## 2017-11-02 NOTE — TELEPHONE ENCOUNTER
----- Message from Gogo Saeed sent at 11/2/2017 10:41 AM EDT -----  Regarding: DR HASKINS  PT IS IN A LOT OF PAIN, FELL AND PASSED OUT. DOESN'T SEE DR HASKINS UNTIL NOV 8TH. PT SISTER OCTAVIANO RUTLEDGE. [phone] THANKS

## 2017-11-02 NOTE — TELEPHONE ENCOUNTER
----- Message from Gogo Saeed sent at 11/2/2017 10:41 AM EDT -----  Regarding: DR HASKINS  PT IS IN A LOT OF PAIN, FELL AND PASSED OUT. DOESN'T SEE DR HASKINS UNTIL NOV 8TH. PT SISTER OCTAVIANO RUTLEDGE. [phone] THANKS

## 2017-11-02 NOTE — TELEPHONE ENCOUNTER
Rosangela states that patient has been having increased weakness, not eating, uncontrolled pain, and has been having syncopal episodes for the past week.  Recommend that she take patient to the ER for evaluation of syncope and pain

## 2017-11-08 NOTE — PROGRESS NOTES
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 male  who was in his usual state of health until approximately February 2017. The patient presented with mass in his left groin, this has been gradually increasing in size. Patient never had this problem before. This is associated with mild pain get worse with activity and improves with laying flat. Patient went and so his primary care provider who referred him to see his surgeon. Patient so Dr. Wilson on February 28, 2017. Patient had a biopsy done March 3, 2017 that revealed metastatic melanoma.  The patient had CAT scan abdomen and pelvis done February 12, 2017 revealed left inguinal and iliac chain adenopathy.  Unfortunately insurance had declined whole body PET scan as well as MRI brain for staging purposes and there was no option.  The patient was started on Opdivo plus Yervoy on April 12, 2017. He completed 3 cycles and last dose was canceled secondary to colitis, that resolved after a short course of steroids.  He was started on Opdivo single agent July 26, 2017. He had lost to follow-up over the last 2 months.  The patient is here today for cycle #2.     SUBJECTIVE: He could not tolerate prednisone secondary to insomnia as well as fluid retention.  Lipitor 20 half weeks then stop.  He has been compliant with thyroid replacement.  He is requesting insomnia medicine since over-the-counter medicine has not been helping.  His  is requesting a refill on pain medicine.  His itching and improves partially with Benadryl.     PAST MEDICAL HISTORY/SOCIAL HISTORY/FAMILY HISTORY: Unchanged from my prior documentation done on March 27 2017    Review of Systems   Constitutional: Negative for activity change, appetite change, chills, fatigue, fever and unexpected weight change.   HENT: Negative for hearing loss, mouth sores, nosebleeds, sore  throat and trouble swallowing.    Eyes: Negative for visual disturbance.   Respiratory: Negative for cough, chest tightness, shortness of breath and wheezing.    Cardiovascular: Negative for chest pain, palpitations and leg swelling.   Gastrointestinal: Negative for abdominal distention, abdominal pain, blood in stool, constipation, diarrhea, nausea, rectal pain and vomiting.   Endocrine: Negative for cold intolerance and heat intolerance.   Genitourinary: Negative for difficulty urinating, dysuria, frequency and urgency.   Musculoskeletal: Negative for arthralgias, back pain, gait problem, joint swelling and myalgias.   Skin: Negative for rash.   Neurological: Negative for dizziness, tremors, syncope, weakness, light-headedness, numbness and headaches.   Hematological: Negative for adenopathy. Does not bruise/bleed easily.   Psychiatric/Behavioral: Negative for confusion, sleep disturbance and suicidal ideas. The patient is not nervous/anxious.          Current Outpatient Prescriptions:   •  atorvastatin (LIPITOR) 20 MG tablet, Take 20 mg by mouth Daily., Disp: , Rfl:   •  diphenoxylate-atropine (LOMOTIL) 2.5-0.025 MG per tablet, Take 1 tablet by mouth Every 6 (Six) Hours As Needed for Diarrhea. 1 tablet, Disp: 30 tablet, Rfl: 5  •  glipiZIDE (GLUCOTROL) 5 MG tablet, Take 5 mg by mouth 2 (Two) Times a Day Before Meals., Disp: , Rfl:   •  hydrOXYzine (ATARAX) 25 MG tablet, Take 25 mg by mouth 3 (Three) Times a Day As Needed for Itching., Disp: , Rfl:   •  levothyroxine (SYNTHROID, LEVOTHROID) 50 MCG tablet, Take 1 tablet by mouth Daily. Then take 75mg daily after completing 50mg for 1 week, Disp: 7 tablet, Rfl: 0  •  levothyroxine (SYNTHROID, LEVOTHROID) 75 MCG tablet, Take 1 tablet by mouth Daily., Disp: 30 tablet, Rfl: 3  •  lisinopril (PRINIVIL,ZESTRIL) 10 MG tablet, Take 10 mg by mouth Daily., Disp: , Rfl:   •  ondansetron (ZOFRAN) 8 MG tablet, Take 1 tablet by mouth 3 (Three) Times a Day As Needed for Nausea or  Vomiting., Disp: 30 tablet, Rfl: 5  •  oxyCODONE (ROXICODONE) 5 MG immediate release tablet, Take 1 tablet by mouth Every 6 (Six) Hours As Needed for Moderate Pain ., Disp: 120 tablet, Rfl: 0  •  predniSONE (DELTASONE) 10 MG tablet, Take 1 tablet by mouth Daily. 6 tabs daily for 1 week, 5 tabs for 1 week, 4 for 1 week, 3 for 1 week, 2 for 1 week, 1 for 1 week, stop, Disp: 189 tablet, Rfl: 0    PHYSICAL EXAMINATION:   /83  Pulse 88  Temp 97.2 °F (36.2 °C) (Temporal Artery )   Resp 18  Wt 196 lb (88.9 kg)  BMI 29.8 kg/m2   ECOG Performance Status: 0 - Asymptomatic  General Appearance:  alert, cooperative, no apparent distress and appears stated age   Neurologic/Psychiatric: A&O x 3, gait steady, appropriate affect, strength 5/5 in all muscle groups   HEENT:  Normocephalic, without obvious abnormality, mucous membranes moist   Neck: Supple, symmetrical, trachea midline, no adenopathy;  No thyromegaly, masses, or tenderness   Lungs:   Clear to auscultation bilaterally; respirations regular, even, and unlabored bilaterally   Heart:  Regular rate and rhythm, no murmurs appreciated   Abdomen:   Soft, non-tender, non-distended and no organomegaly   Lymph nodes: No cervical, supraclavicular, inguinal or axillary adenopathy noted   Extremities: Normal, atraumatic; no clubbing, cyanosis, or edema    Skin: No rashes, ulcers, or suspicious lesions noted     No visits with results within 2 Week(s) from this visit.  Latest known visit with results is:    Infusion on 10/11/2017   Component Date Value Ref Range Status   • Glucose 10/11/2017 173* 74 - 98 mg/dL Final   • BUN 10/11/2017 4* 7 - 20 mg/dL Final   • Creatinine 10/11/2017 1.00  0.60 - 1.30 mg/dL Final   • Sodium 10/11/2017 138  137 - 145 mmol/L Final   • Potassium 10/11/2017 4.1  3.5 - 5.1 mmol/L Final   • Chloride 10/11/2017 96* 98 - 107 mmol/L Final   • CO2 10/11/2017 30.0  26.0 - 30.0 mmol/L Final   • Calcium 10/11/2017 9.0  8.4 - 10.2 mg/dL Final   • Total  Protein 10/11/2017 8.9* 6.3 - 8.2 g/dL Final   • Albumin 10/11/2017 4.20  3.50 - 5.00 g/dL Final   • ALT (SGPT) 10/11/2017 141* 13 - 69 U/L Final   • AST (SGOT) 10/11/2017 384* 15 - 46 U/L Final   • Alkaline Phosphatase 10/11/2017 190* 38 - 126 U/L Final   • Total Bilirubin 10/11/2017 4.0* 0.2 - 1.3 mg/dL Final   • eGFR Non African Amer 10/11/2017 78  >60 mL/min/1.73 Final   • Globulin 10/11/2017 4.7  gm/dL Final   • A/G Ratio 10/11/2017 0.9* 1.0 - 2.0 g/dL Final   • BUN/Creatinine Ratio 10/11/2017 4.0* 6.3 - 21.9 Final   • Anion Gap 10/11/2017 16.1  mmol/L Final   • TSH 10/11/2017 75.800* 0.470 - 4.680 mIU/mL Final   • Free T4 10/11/2017 <0.07* 0.78 - 2.19 ng/dL Final   • WBC 10/11/2017 7.97  4.80 - 10.80 10*3/mm3 Final   • RBC 10/11/2017 4.67* 4.70 - 6.10 10*6/mm3 Final   • Hemoglobin 10/11/2017 16.1  14.0 - 18.0 g/dL Final   • Hematocrit 10/11/2017 45.8  42.0 - 52.0 % Final   • MCV 10/11/2017 98.1* 80.0 - 94.0 fL Final   • MCH 10/11/2017 34.5* 27.0 - 31.0 pg Final   • MCHC 10/11/2017 35.2  30.0 - 37.0 g/dL Final   • RDW 10/11/2017 13.3  11.5 - 14.5 % Final   • RDW-SD 10/11/2017 48.1  37.0 - 54.0 fl Final   • MPV 10/11/2017 10.3  6.0 - 12.0 fL Final   • Platelets 10/11/2017 133  130 - 400 10*3/mm3 Final   • Neutrophil % 10/11/2017 60.3  37.0 - 80.0 % Final   • Lymphocyte % 10/11/2017 14.7  10.0 - 50.0 % Final   • Monocyte % 10/11/2017 7.3  0.0 - 12.0 % Final   • Eosinophil % 10/11/2017 15.6* 0.0 - 7.0 % Final   • Basophil % 10/11/2017 1.6  0.0 - 2.5 % Final   • Immature Grans % 10/11/2017 0.5  0.0 - 0.6 % Final   • Neutrophils, Absolute 10/11/2017 4.81  2.00 - 6.90 10*3/mm3 Final   • Lymphocytes, Absolute 10/11/2017 1.17  0.60 - 3.40 10*3/mm3 Final   • Monocytes, Absolute 10/11/2017 0.58  0.00 - 0.90 10*3/mm3 Final   • Eosinophils, Absolute 10/11/2017 1.24* 0.00 - 0.70 10*3/mm3 Final   • Basophils, Absolute 10/11/2017 0.13  0.00 - 0.20 10*3/mm3 Final   • Immature Grans, Absolute 10/11/2017 0.04  0.00 - 0.06  10*3/mm3 Final   • nRBC 10/11/2017 0.0  0.0 - 0.0 /100 WBC Final        No results found.    ASSESSMENT: The patient is a very pleasant 56 y.o. male with metastatic melanoma     PROBLEM LIST:   1. Metastatic melanoma diagnosed after ultrasound-guided biopsy left inguinal lymph node March 2, 2017.  [place] NX M1 B.  Disease burden left inguinal and left iliac chain lymphadenopathy.  2. Remote history of melanoma left check and left lower lip 12 and 13 years ago.  3. Started Opdivo plus Yervoy April 12, 2017, status post 3 cycles.  This was stopped secondary to colitis.   4.  Started Opdivo single agent July 26, 2017 status post 1 cycle  5.  Treatment induced colitis, grade 3, resolved  6.  Treatment induced dermatitis, grade 2  7.  Treatment induced hepatitis, grade 2   8 .  Treatment-induced thyroiditis, grade 2   9.  Alcohol abuse  10.  Hepatitis  11. Uncontrolled type 2 diabetes  12. Cancer related pain    PLAN:  1.  I will repeat patient blood work today CBC liver enzymes and thyroid function.  2.  The patient would would like to continue to hold treatment at this point.    3.  We discussed potential side effects of immunotherapy including but not limited to immune mediated reactions with thyroiditis, pneumonitis, hepatitis, colitis, rash, and electrolytes abnormalities, fatigue, multiorgan failure, and possibly death.  4.  I will refill his Percocet 5/325 mg oxycodone 5 mg every 6 hours as needed for cancer-related pain.  I was given a new prescription today.  5.  The patient follow with me in 4 weeks for cycle #2.  6.  I'll be monitoring patient blood counts kidney function liver function as well as thyroid function.  7.  I will continue the patient on Reglan 5 mg 3 times a day as well as Zofran 8 mg every 6 hours as needed for nausea.  8.   Patient will follow-up with Dr. Chacko for uncontrolled type 2 diabetes.  9.  I will continue the patient on Imodium as well as Lomotil as needed for diarrhea.  10.  I'll  continue the patient on Benadryl as needed for itching  11.  I will continue patient on Zofran as needed for nausea.  12.  I will do a repeat CAT scan prior to return.  13.  I will start patient on Ambien as needed for insomnia.    Kavitha Martins MD  11/8/2017

## 2017-12-06 NOTE — PROGRESS NOTES
DATE OF VISIT: 12/6/2017    REASON FOR VISIT: Followup for Metastatic melanoma with spindle cell features      HISTORY OF PRESENT ILLNESS: The patient is a very pleasant 57 y.o. male  who was in his usual state of health until approximately February 2017. The patient presented with mass in his left groin, this has been gradually increasing in size. Patient never had this problem before. This is associated with mild pain get worse with activity and improves with laying flat. Patient went and so his primary care provider who referred him to see his surgeon. Patient so Dr. Wilson on February 28, 2017. Patient had a biopsy done March 3, 2017 that revealed metastatic melanoma.  The patient had CAT scan abdomen and pelvis done February 12, 2017 revealed left inguinal and iliac chain adenopathy.  Unfortunately insurance had declined whole body PET scan as well as MRI brain for staging purposes and there was no option.  The patient was started on Opdivo plus Yervoy on April 12, 2017. He completed 3 cycles and last dose was canceled secondary to colitis, that resolved after a short course of steroids.  He was started on Opdivo single agent July 26, 2017. He had lost to follow-up over the last 2 months.  The patient is here today for cycle #2.     SUBJECTIVE: He could not tolerate prednisone secondary to insomnia as well as fluid retention.  Lipitor 20 half weeks then stop.  He has been compliant with thyroid replacement.  He is requesting insomnia medicine since over-the-counter medicine has not been helping.  His  is requesting a refill on pain medicine.  His itching and improves partially with Benadryl.     PAST MEDICAL HISTORY/SOCIAL HISTORY/FAMILY HISTORY: Unchanged from my prior documentation done on March 27 2017    Review of Systems   Constitutional: Negative for activity change, appetite change, chills, fatigue, fever and unexpected weight change.   HENT: Negative for hearing loss, mouth sores, nosebleeds, sore  throat and trouble swallowing.    Eyes: Negative for visual disturbance.   Respiratory: Negative for cough, chest tightness, shortness of breath and wheezing.    Cardiovascular: Negative for chest pain, palpitations and leg swelling.   Gastrointestinal: Negative for abdominal distention, abdominal pain, blood in stool, constipation, diarrhea, nausea, rectal pain and vomiting.   Endocrine: Negative for cold intolerance and heat intolerance.   Genitourinary: Negative for difficulty urinating, dysuria, frequency and urgency.   Musculoskeletal: Negative for arthralgias, back pain, gait problem, joint swelling and myalgias.   Skin: Negative for rash.   Neurological: Negative for dizziness, tremors, syncope, weakness, light-headedness, numbness and headaches.   Hematological: Negative for adenopathy. Does not bruise/bleed easily.   Psychiatric/Behavioral: Negative for confusion, sleep disturbance and suicidal ideas. The patient is not nervous/anxious.          Current Outpatient Prescriptions:   •  atorvastatin (LIPITOR) 20 MG tablet, Take 20 mg by mouth Daily., Disp: , Rfl:   •  diphenoxylate-atropine (LOMOTIL) 2.5-0.025 MG per tablet, Take 1 tablet by mouth Every 6 (Six) Hours As Needed for Diarrhea. 1 tablet, Disp: 30 tablet, Rfl: 5  •  glipiZIDE (GLUCOTROL) 5 MG tablet, Take 5 mg by mouth 2 (Two) Times a Day Before Meals., Disp: , Rfl:   •  hydrOXYzine (ATARAX) 25 MG tablet, Take 25 mg by mouth 3 (Three) Times a Day As Needed for Itching., Disp: , Rfl:   •  levothyroxine (SYNTHROID, LEVOTHROID) 50 MCG tablet, Take 1 tablet by mouth Daily. Then take 75mg daily after completing 50mg for 1 week, Disp: 7 tablet, Rfl: 0  •  levothyroxine (SYNTHROID, LEVOTHROID) 75 MCG tablet, Take 1 tablet by mouth Daily., Disp: 30 tablet, Rfl: 3  •  lisinopril (PRINIVIL,ZESTRIL) 10 MG tablet, Take 10 mg by mouth Daily., Disp: , Rfl:   •  ondansetron (ZOFRAN) 8 MG tablet, Take 1 tablet by mouth 3 (Three) Times a Day As Needed for Nausea or  Vomiting., Disp: 30 tablet, Rfl: 5  •  oxyCODONE (ROXICODONE) 5 MG immediate release tablet, Take 1 tablet by mouth Every 6 (Six) Hours As Needed for Moderate Pain ., Disp: 120 tablet, Rfl: 0  •  predniSONE (DELTASONE) 10 MG tablet, Take 1 tablet by mouth Daily. 6 tabs daily for 1 week, 5 tabs for 1 week, 4 for 1 week, 3 for 1 week, 2 for 1 week, 1 for 1 week, stop, Disp: 189 tablet, Rfl: 0  •  zolpidem (AMBIEN) 5 MG tablet, Take 1 tablet by mouth At Night As Needed for Sleep., Disp: 30 tablet, Rfl: 2    PHYSICAL EXAMINATION:   /90  Pulse 106  Temp 97.2 °F (36.2 °C) (Temporal Artery )   Resp 18  Wt 86.6 kg (191 lb)  BMI 29.04 kg/m2   ECOG Performance Status: 0 - Asymptomatic  General Appearance:  alert, cooperative, no apparent distress and appears stated age   Neurologic/Psychiatric: A&O x 3, gait steady, appropriate affect, strength 5/5 in all muscle groups   HEENT:  Normocephalic, without obvious abnormality, mucous membranes moist   Neck: Supple, symmetrical, trachea midline, no adenopathy;  No thyromegaly, masses, or tenderness   Lungs:   Clear to auscultation bilaterally; respirations regular, even, and unlabored bilaterally   Heart:  Regular rate and rhythm, no murmurs appreciated   Abdomen:   Soft, non-tender, non-distended and no organomegaly   Lymph nodes: No cervical, supraclavicular, inguinal or axillary adenopathy noted   Extremities: Normal, atraumatic; no clubbing, cyanosis, or edema    Skin: No rashes, ulcers, or suspicious lesions noted     No visits with results within 2 Week(s) from this visit.  Latest known visit with results is:    Lab on 11/08/2017   Component Date Value Ref Range Status   • Glucose 11/08/2017 146* 74 - 98 mg/dL Final   • BUN 11/08/2017 8  7 - 20 mg/dL Final   • Creatinine 11/08/2017 0.70  0.60 - 1.30 mg/dL Final   • Sodium 11/08/2017 135* 137 - 145 mmol/L Final   • Potassium 11/08/2017 4.0  3.5 - 5.1 mmol/L Final   • Chloride 11/08/2017 96* 98 - 107 mmol/L Final   •  CO2 11/08/2017 27.0  26.0 - 30.0 mmol/L Final   • Calcium 11/08/2017 8.6  8.4 - 10.2 mg/dL Final   • Total Protein 11/08/2017 7.7  6.3 - 8.2 g/dL Final   • Albumin 11/08/2017 4.10  3.50 - 5.00 g/dL Final   • ALT (SGPT) 11/08/2017 128* 13 - 69 U/L Final   • AST (SGOT) 11/08/2017 209* 15 - 46 U/L Final   • Alkaline Phosphatase 11/08/2017 293* 38 - 126 U/L Final   • Total Bilirubin 11/08/2017 3.0* 0.2 - 1.3 mg/dL Final   • eGFR Non  Amer 11/08/2017 117  >60 mL/min/1.73 Final   • Globulin 11/08/2017 3.6  gm/dL Final   • A/G Ratio 11/08/2017 1.1  1.0 - 2.0 g/dL Final   • BUN/Creatinine Ratio 11/08/2017 11.4  6.3 - 21.9 Final   • Anion Gap 11/08/2017 16.0  mmol/L Final   • WBC 11/08/2017 6.12  4.80 - 10.80 10*3/mm3 Final   • RBC 11/08/2017 3.54* 4.70 - 6.10 10*6/mm3 Final   • Hemoglobin 11/08/2017 12.4* 14.0 - 18.0 g/dL Final   • Hematocrit 11/08/2017 35.6* 42.0 - 52.0 % Final   • MCV 11/08/2017 100.6* 80.0 - 94.0 fL Final   • MCH 11/08/2017 35.0* 27.0 - 31.0 pg Final   • MCHC 11/08/2017 34.8  30.0 - 37.0 g/dL Final   • RDW 11/08/2017 13.2  11.5 - 14.5 % Final   • RDW-SD 11/08/2017 49.1  37.0 - 54.0 fl Final   • MPV 11/08/2017 10.3  6.0 - 12.0 fL Final   • Platelets 11/08/2017 159  130 - 400 10*3/mm3 Final   • Neutrophil % 11/08/2017 66.5  37.0 - 80.0 % Final   • Lymphocyte % 11/08/2017 16.7  10.0 - 50.0 % Final   • Monocyte % 11/08/2017 10.8  0.0 - 12.0 % Final   • Eosinophil % 11/08/2017 3.1  0.0 - 7.0 % Final   • Basophil % 11/08/2017 0.8  0.0 - 2.5 % Final   • Immature Grans % 11/08/2017 2.1* 0.0 - 0.6 % Final   • Neutrophils, Absolute 11/08/2017 4.07  2.00 - 6.90 10*3/mm3 Final   • Lymphocytes, Absolute 11/08/2017 1.02  0.60 - 3.40 10*3/mm3 Final   • Monocytes, Absolute 11/08/2017 0.66  0.00 - 0.90 10*3/mm3 Final   • Eosinophils, Absolute 11/08/2017 0.19  0.00 - 0.70 10*3/mm3 Final   • Basophils, Absolute 11/08/2017 0.05  0.00 - 0.20 10*3/mm3 Final   • Immature Grans, Absolute 11/08/2017 0.13* 0.00 - 0.06  10*3/mm3 Final   • [facility] 11/08/2017 0.0  0.0 - 0.0 /100 WBC Final        Ct Chest With Contrast    Result Date: 12/5/2017  Narrative: EXAMINATION: CT CHEST W CONTRAST-, CT ABDOMEN PELVIS W CONTRAST- 12/04/2017  INDICATION: C77.9-Secondary and unspecified malignant neoplasm of lymph node, unspecified; metastatic melanoma  TECHNIQUE: Multiple axial CT imaging was obtained of the chest, abdomen and pelvis following the administration of intravenous contrast.  The radiation dose reduction device was turned on for each scan per the ALARA (As Low as Reasonably Achievable) protocol.  COMPARISON: 07/17/2017  FINDINGS:  CHEST: The thyroid is homogeneous in appearance. No mediastinal mass or adenopathy. The cardiac chambers are within normal limits. No pericardial effusion. Heterogeneous appearance identified of the liver. This is stable and unchanged when compared to the prior study. There is no dominant mass or lesion identified. Continued follow-up recommended. Degenerative changes seen within the spine. No bulky hilar or axillary lymphadenopathy. Thyroid is homogeneous.  The lung parenchyma reveals no parenchymal consolidation. Calcification seen in the left hilar region from old healed granulomatous disease. Calcified granuloma identified within the right lower lung. No definite pleural effusion or pneumothorax. No parenchymal consolidation. No pulmonary mass or nodule. Degenerative changes seen within the spine.  ABDOMEN: Again heterogeneity identified diffusely throughout the liver. There is no dominant underlying mass or lesion. Kidneys and adrenal glands within normal limits. The pancreas is homogeneous. No free fluid or free air. No stones in the gallbladder. The spleen is unremarkable. Abdominal portion of the gastrointestinal tract within normal limits. No free fluid or free air. No abnormal mass or fluid collections identified.  PELVIS: The pelvic organs are unremarkable in appearance. The pelvic portion of the  gastrointestinal tract within normal limits. No free fluid or free air. No abnormal mass or fluid collections identified. There is no pelvic adenopathy. Abnormal soft tissue density seen within the left inguinal region which when compared to the prior study is stable and unchanged. Several borderline enlarged lymph nodes as well seen in this area. There is no other pelvic adenopathy identified.      Impression: Stable abnormal soft tissue seen again in the left inguinal region. There is surrounding stranding again concerning for metastatic adenopathy. Findings are stable and unchanged. Remainder of the CT chest, abdomen and pelvis is unremarkable.  D:  12/05/2017 E:  12/05/2017   This report was finalized on 12/5/2017 2:14 PM by Dr. Carol Bernal MD.      Ct Abdomen Pelvis With Contrast    Result Date: 12/5/2017  Narrative: EXAMINATION: CT CHEST W CONTRAST-, CT ABDOMEN PELVIS W CONTRAST- 12/04/2017  INDICATION: C77.9-Secondary and unspecified malignant neoplasm of lymph node, unspecified; metastatic melanoma  TECHNIQUE: Multiple axial CT imaging was obtained of the chest, abdomen and pelvis following the administration of intravenous contrast.  The radiation dose reduction device was turned on for each scan per the ALARA (As Low as Reasonably Achievable) protocol.  COMPARISON: 07/17/2017  FINDINGS:  CHEST: The thyroid is homogeneous in appearance. No mediastinal mass or adenopathy. The cardiac chambers are within normal limits. No pericardial effusion. Heterogeneous appearance identified of the liver. This is stable and unchanged when compared to the prior study. There is no dominant mass or lesion identified. Continued follow-up recommended. Degenerative changes seen within the spine. No bulky hilar or axillary lymphadenopathy. Thyroid is homogeneous.  The lung parenchyma reveals no parenchymal consolidation. Calcification seen in the left hilar region from old healed granulomatous disease. Calcified granuloma  identified within the right lower lung. No definite pleural effusion or pneumothorax. No parenchymal consolidation. No pulmonary mass or nodule. Degenerative changes seen within the spine.  ABDOMEN: Again heterogeneity identified diffusely throughout the liver. There is no dominant underlying mass or lesion. Kidneys and adrenal glands within normal limits. The pancreas is homogeneous. No free fluid or free air. No stones in the gallbladder. The spleen is unremarkable. Abdominal portion of the gastrointestinal tract within normal limits. No free fluid or free air. No abnormal mass or fluid collections identified.  PELVIS: The pelvic organs are unremarkable in appearance. The pelvic portion of the gastrointestinal tract within normal limits. No free fluid or free air. No abnormal mass or fluid collections identified. There is no pelvic adenopathy. Abnormal soft tissue density seen within the left inguinal region which when compared to the prior study is stable and unchanged. Several borderline enlarged lymph nodes as well seen in this area. There is no other pelvic adenopathy identified.      Impression: Stable abnormal soft tissue seen again in the left inguinal region. There is surrounding stranding again concerning for metastatic adenopathy. Findings are stable and unchanged. Remainder of the CT chest, abdomen and pelvis is unremarkable.  D:  12/05/2017 E:  12/05/2017   This report was finalized on 12/5/2017 2:14 PM by Dr. Carol Bernal MD.        ASSESSMENT: The patient is a very pleasant 56 y.o. male with metastatic melanoma     PROBLEM LIST:   1. Metastatic melanoma diagnosed after ultrasound-guided biopsy left inguinal lymph node March 2, 2017.  TX NX M1 B.  Disease burden left inguinal and left iliac chain lymphadenopathy.  2. Remote history of melanoma left check and left lower lip 12 and 13 years ago.  3. Started Opdivo plus Yervoy April 12, 2017, status post 3 cycles.  This was stopped secondary to  colitis.   4.  Started Opdivo single agent July 26, 2017 status post 1 cycle  5.  Treatment induced colitis, grade 3, resolved  6.  Treatment induced dermatitis, grade 2  7.  Treatment induced hepatitis, grade 2   8 .  Treatment-induced thyroiditis, grade 2   9.  Alcohol abuse  10.  Uncontrolled type 2 diabetes  11. Hypertension  12. Cancer related pain    PLAN:  1.  I will repeat patient blood work today CBC liver enzymes and thyroid function.  2.  The patient would would like to continue to hold treatment at this point.    3.  We discussed potential side effects of immunotherapy including but not limited to immune mediated reactions with thyroiditis, pneumonitis, hepatitis, colitis, rash, and electrolytes abnormalities, fatigue, multiorgan failure, and possibly death.  4.  I will refill his Percocet 5/325 mg oxycodone 5 mg every 6 hours as needed for cancer-related pain.  I was given a new prescription today.  5.  The patient follow with me in 4 weeks repeated labs.  6.  I'll be monitoring patient blood counts kidney function liver function as well as thyroid function.  7.  I will continue the patient on Reglan 5 mg 3 times a day as well as Zofran 8 mg every 6 hours as needed for nausea.  8.   Patient will follow-up with Dr. Chacko for uncontrolled type 2 diabetes.  9.  I will continue the patient on Imodium as well as Lomotil as needed for diarrhea.  10.  I'll continue the patient on Benadryl as needed for itching  11.  I will continue patient on Zofran as needed for nausea.  12.  I did go over the CAT scan results with the patient, I expect him that his disease is essentially stable compared to previous scan done July 2017.  I will do a repeat CAT scan in 3 months which would be due March 2018.  I will consider restarting immunotherapy if his liver enzymes are better and he has evidence of progressive disease on his scans.  13.  I will continue patient on Ambien as needed for insomnia.  14.  I will increase  Synthroid 200 mg daily.  15.  I will refill his lisinopril daily for hypertension.  Kavitha Martins MD  12/6/2017

## 2018-01-01 ENCOUNTER — HOSPITAL ENCOUNTER (OUTPATIENT)
Dept: PET IMAGING | Facility: HOSPITAL | Age: 58
End: 2018-01-01
Attending: INTERNAL MEDICINE

## 2018-01-01 ENCOUNTER — HOSPITAL ENCOUNTER (OUTPATIENT)
Dept: PET IMAGING | Facility: HOSPITAL | Age: 58
Discharge: HOME | End: 2018-04-20
Attending: INTERNAL MEDICINE

## 2018-01-01 ENCOUNTER — TELEPHONE (OUTPATIENT)
Dept: ONCOLOGY | Facility: CLINIC | Age: 58
End: 2018-01-01

## 2018-01-01 ENCOUNTER — APPOINTMENT (OUTPATIENT)
Dept: PET IMAGING | Facility: HOSPITAL | Age: 58
End: 2018-01-01
Attending: INTERNAL MEDICINE

## 2018-01-01 ENCOUNTER — OFFICE VISIT (OUTPATIENT)
Dept: ONCOLOGY | Facility: CLINIC | Age: 58
End: 2018-01-01

## 2018-01-01 ENCOUNTER — HOSPITAL ENCOUNTER (OUTPATIENT)
Dept: MRI IMAGING | Facility: HOSPITAL | Age: 58
Discharge: HOME | End: 2018-05-21

## 2018-01-01 ENCOUNTER — HOSPITAL ENCOUNTER (OUTPATIENT)
Dept: MRI IMAGING | Facility: HOSPITAL | Age: 58
Discharge: HOME | End: 2018-05-18
Admitting: INTERNAL MEDICINE

## 2018-01-01 ENCOUNTER — HOSPITAL ENCOUNTER (OUTPATIENT)
Dept: CT IMAGING | Facility: HOSPITAL | Age: 58
Discharge: HOME | End: 2018-03-21
Attending: INTERNAL MEDICINE | Admitting: INTERNAL MEDICINE

## 2018-01-01 ENCOUNTER — TELEPHONE (OUTPATIENT)
Dept: INFUSION THERAPY | Facility: HOSPITAL | Age: 58
End: 2018-01-01

## 2018-01-01 VITALS
WEIGHT: 186 LBS | HEART RATE: 101 BPM | BODY MASS INDEX: 28.28 KG/M2 | TEMPERATURE: 97.7 F | RESPIRATION RATE: 17 BRPM | SYSTOLIC BLOOD PRESSURE: 190 MMHG | DIASTOLIC BLOOD PRESSURE: 92 MMHG

## 2018-01-01 VITALS
HEIGHT: 68 IN | RESPIRATION RATE: 18 BRPM | DIASTOLIC BLOOD PRESSURE: 80 MMHG | WEIGHT: 184 LBS | BODY MASS INDEX: 27.89 KG/M2 | TEMPERATURE: 97.5 F | HEART RATE: 125 BPM | SYSTOLIC BLOOD PRESSURE: 149 MMHG

## 2018-01-01 VITALS
HEART RATE: 99 BPM | HEIGHT: 68 IN | BODY MASS INDEX: 28.04 KG/M2 | SYSTOLIC BLOOD PRESSURE: 155 MMHG | TEMPERATURE: 97.7 F | WEIGHT: 185 LBS | RESPIRATION RATE: 15 BRPM | DIASTOLIC BLOOD PRESSURE: 80 MMHG

## 2018-01-01 VITALS
DIASTOLIC BLOOD PRESSURE: 80 MMHG | TEMPERATURE: 97.3 F | BODY MASS INDEX: 27.74 KG/M2 | HEART RATE: 94 BPM | SYSTOLIC BLOOD PRESSURE: 141 MMHG | HEIGHT: 68 IN | RESPIRATION RATE: 17 BRPM | WEIGHT: 183 LBS

## 2018-01-01 VITALS
DIASTOLIC BLOOD PRESSURE: 98 MMHG | BODY MASS INDEX: 28.59 KG/M2 | SYSTOLIC BLOOD PRESSURE: 197 MMHG | WEIGHT: 188 LBS | HEART RATE: 106 BPM | TEMPERATURE: 97.5 F | RESPIRATION RATE: 20 BRPM

## 2018-01-01 VITALS
RESPIRATION RATE: 13 BRPM | WEIGHT: 188 LBS | DIASTOLIC BLOOD PRESSURE: 89 MMHG | BODY MASS INDEX: 28.59 KG/M2 | SYSTOLIC BLOOD PRESSURE: 187 MMHG | HEART RATE: 97 BPM | TEMPERATURE: 97.4 F

## 2018-01-01 DIAGNOSIS — C77.9 METASTATIC MELANOMA TO LYMPH NODE (HCC): ICD-10-CM

## 2018-01-01 DIAGNOSIS — C77.9 METASTATIC MELANOMA TO LYMPH NODE (HCC): Primary | ICD-10-CM

## 2018-01-01 LAB
ALBUMIN SERPL-MCNC: 3.8 G/DL (ref 3.5–5)
ALBUMIN/GLOB SERPL: 1 G/DL (ref 1–2)
ALP SERPL-CCNC: 202 U/L (ref 38–126)
ALT SERPL-CCNC: 58 U/L (ref 13–69)
AST SERPL-CCNC: 185 U/L (ref 15–46)
BASOPHILS # BLD AUTO: 0.06 10*3/MM3 (ref 0–0.2)
BASOPHILS NFR BLD AUTO: 0.8 % (ref 0–2.5)
BILIRUB SERPL-MCNC: 2.1 MG/DL (ref 0.2–1.3)
BUN SERPL-MCNC: 4 MG/DL (ref 7–20)
BUN/CREAT SERPL: 8 (ref 6.3–21.9)
CALCIUM SERPL-MCNC: 9.4 MG/DL (ref 8.4–10.2)
CHLORIDE SERPL-SCNC: 97 MMOL/L (ref 98–107)
CO2 SERPL-SCNC: 30 MMOL/L (ref 26–30)
CREAT BLDA-MCNC: 0.6 MG/DL (ref 0.6–1.3)
CREAT SERPL-MCNC: 0.5 MG/DL (ref 0.6–1.3)
EOSINOPHIL # BLD AUTO: 0.25 10*3/MM3 (ref 0–0.7)
EOSINOPHIL NFR BLD AUTO: 3.4 % (ref 0–7)
ERYTHROCYTE [DISTWIDTH] IN BLOOD BY AUTOMATED COUNT: 13.6 % (ref 11.5–14.5)
GFR SERPLBLD CREATININE-BSD FMLA CKD-EPI: >150 ML/MIN/1.73
GFR SERPLBLD CREATININE-BSD FMLA CKD-EPI: >150 ML/MIN/1.73
GLOBULIN SER CALC-MCNC: 3.8 GM/DL
GLUCOSE SERPL-MCNC: 137 MG/DL (ref 74–98)
HCT VFR BLD AUTO: 41 % (ref 42–52)
HGB BLD-MCNC: 13.8 G/DL (ref 14–18)
IMM GRANULOCYTES # BLD: 0.04 10*3/MM3 (ref 0–0.06)
IMM GRANULOCYTES NFR BLD: 0.5 % (ref 0–0.6)
LYMPHOCYTES # BLD AUTO: 1 10*3/MM3 (ref 0.6–3.4)
LYMPHOCYTES NFR BLD AUTO: 13.5 % (ref 10–50)
MCH RBC QN AUTO: 33.7 PG (ref 27–31)
MCHC RBC AUTO-ENTMCNC: 33.7 G/DL (ref 30–37)
MCV RBC AUTO: 100 FL (ref 80–94)
MONOCYTES # BLD AUTO: 0.72 10*3/MM3 (ref 0–0.9)
MONOCYTES NFR BLD AUTO: 9.7 % (ref 0–12)
NEUTROPHILS # BLD AUTO: 5.32 10*3/MM3 (ref 2–6.9)
NEUTROPHILS NFR BLD AUTO: 72.1 % (ref 37–80)
NRBC BLD AUTO-RTO: 0 /100 WBC (ref 0–0)
PLATELET # BLD AUTO: 166 10*3/MM3 (ref 130–400)
POTASSIUM SERPL-SCNC: 4.3 MMOL/L (ref 3.5–5.1)
PROT SERPL-MCNC: 7.6 G/DL (ref 6.3–8.2)
RBC # BLD AUTO: 4.1 10*6/MM3 (ref 4.7–6.1)
SODIUM SERPL-SCNC: 141 MMOL/L (ref 137–145)
WBC # BLD AUTO: 7.39 10*3/MM3 (ref 4.8–10.8)

## 2018-01-01 PROCEDURE — A9577 INJ MULTIHANCE: HCPCS | Performed by: INTERNAL MEDICINE

## 2018-01-01 PROCEDURE — 25010000002 IOPAMIDOL 61 % SOLUTION: Performed by: INTERNAL MEDICINE

## 2018-01-01 PROCEDURE — 99214 OFFICE O/P EST MOD 30 MIN: CPT | Performed by: NURSE PRACTITIONER

## 2018-01-01 PROCEDURE — 99214 OFFICE O/P EST MOD 30 MIN: CPT | Performed by: INTERNAL MEDICINE

## 2018-01-01 PROCEDURE — 82565 ASSAY OF CREATININE: CPT

## 2018-01-01 PROCEDURE — 71260 CT THORAX DX C+: CPT

## 2018-01-01 PROCEDURE — 0 GADOBENATE DIMEGLUMINE 529 MG/ML SOLUTION: Performed by: INTERNAL MEDICINE

## 2018-01-01 PROCEDURE — 74177 CT ABD & PELVIS W/CONTRAST: CPT

## 2018-01-01 PROCEDURE — 70553 MRI BRAIN STEM W/O & W/DYE: CPT

## 2018-01-01 PROCEDURE — 99215 OFFICE O/P EST HI 40 MIN: CPT | Performed by: INTERNAL MEDICINE

## 2018-01-01 RX ORDER — OXYCODONE HYDROCHLORIDE 5 MG/1
5 TABLET ORAL EVERY 6 HOURS PRN
Qty: 120 TABLET | Refills: 0 | Status: SHIPPED | OUTPATIENT
Start: 2018-01-01 | End: 2018-01-01 | Stop reason: SDUPTHER

## 2018-01-01 RX ORDER — LISINOPRIL 10 MG/1
10 TABLET ORAL DAILY
Qty: 30 TABLET | Refills: 5 | Status: SHIPPED | OUTPATIENT
Start: 2018-01-01

## 2018-01-01 RX ORDER — PREDNISONE 10 MG/1
10 TABLET ORAL DAILY
Qty: 45 TABLET | Refills: 0 | Status: SHIPPED | OUTPATIENT
Start: 2018-01-01 | End: 2018-01-01 | Stop reason: SDUPTHER

## 2018-01-01 RX ORDER — PREDNISONE 10 MG/1
10 TABLET ORAL DAILY
Qty: 45 TABLET | Refills: 0 | Status: SHIPPED | OUTPATIENT
Start: 2018-01-01

## 2018-01-01 RX ORDER — PROMETHAZINE HYDROCHLORIDE 25 MG/1
25 TABLET ORAL EVERY 6 HOURS PRN
Qty: 45 TABLET | Refills: 5 | Status: SHIPPED | OUTPATIENT
Start: 2018-01-01

## 2018-01-01 RX ORDER — ONDANSETRON 8 MG/1
8 TABLET, ORALLY DISINTEGRATING ORAL EVERY 8 HOURS PRN
Qty: 60 TABLET | Refills: 5 | Status: SHIPPED | OUTPATIENT
Start: 2018-01-01

## 2018-01-01 RX ORDER — RANITIDINE 150 MG/1
300 TABLET ORAL DAILY
Qty: 60 TABLET | Refills: 5 | Status: SHIPPED | OUTPATIENT
Start: 2018-01-01

## 2018-01-01 RX ORDER — MORPHINE SULFATE 100 MG/5ML
5-10 SOLUTION ORAL
Qty: 30 ML | Refills: 0 | Status: SHIPPED | OUTPATIENT
Start: 2018-01-01

## 2018-01-01 RX ORDER — OXYCODONE HYDROCHLORIDE 5 MG/1
5 TABLET ORAL EVERY 6 HOURS PRN
Qty: 120 TABLET | Refills: 0 | Status: SHIPPED | OUTPATIENT
Start: 2018-01-01

## 2018-01-01 RX ADMIN — IOPAMIDOL 95 ML: 612 INJECTION, SOLUTION INTRAVENOUS at 09:20

## 2018-01-01 RX ADMIN — GADOBENATE DIMEGLUMINE 15 ML: 529 INJECTION, SOLUTION INTRAVENOUS at 17:00

## 2018-01-03 NOTE — PROGRESS NOTES
DATE OF VISIT: 1/3/2018    REASON FOR VISIT: Followup for Metastatic melanoma with spindle cell features      HISTORY OF PRESENT ILLNESS: The patient is a very pleasant 57 y.o. male  who was in his usual state of health until approximately February 2017. The patient presented with mass in his left groin, this has been gradually increasing in size. Patient never had this problem before. This is associated with mild pain get worse with activity and improves with laying flat. Patient went and so his primary care provider who referred him to see his surgeon. Patient so Dr. Wilson on February 28, 2017. Patient had a biopsy done March 3, 2017 that revealed metastatic melanoma.  The patient had CAT scan abdomen and pelvis done February 12, 2017 revealed left inguinal and iliac chain adenopathy.  Unfortunately insurance had declined whole body PET scan as well as MRI brain for staging purposes and there was no option.  The patient was started on Opdivo plus Yervoy on April 12, 2017. He completed 3 cycles and last dose was canceled secondary to colitis, that resolved after a short course of steroids.  He was started on Opdivo single agent July 26, 2017. He had lost to follow-up.  His repeat blood work on return showed hepatitis and he was started on another round of tapered prednisone.  The patient is here today for scheduled follow-up visit.    SUBJECTIVE: He has been doing fairly well.   He has been compliant with thyroid replacement.  His insomnia has improved somewhat.  He  is requesting a refill on pain medicine.  His itching and improves partially with Benadryl.     PAST MEDICAL HISTORY/SOCIAL HISTORY/FAMILY HISTORY: Unchanged from my prior documentation done on March 27 2017    Review of Systems   Constitutional: Negative for activity change, appetite change, chills, fatigue, fever and unexpected weight change.   HENT: Negative for hearing loss, mouth sores, nosebleeds, sore throat and trouble swallowing.    Eyes:  Negative for visual disturbance.   Respiratory: Negative for cough, chest tightness, shortness of breath and wheezing.    Cardiovascular: Negative for chest pain, palpitations and leg swelling.   Gastrointestinal: Negative for abdominal distention, abdominal pain, blood in stool, constipation, diarrhea, nausea, rectal pain and vomiting.   Endocrine: Negative for cold intolerance and heat intolerance.   Genitourinary: Negative for difficulty urinating, dysuria, frequency and urgency.   Musculoskeletal: Negative for arthralgias, back pain, gait problem, joint swelling and myalgias.   Skin: Negative for rash.   Neurological: Negative for dizziness, tremors, syncope, weakness, light-headedness, numbness and headaches.   Hematological: Negative for adenopathy. Does not bruise/bleed easily.   Psychiatric/Behavioral: Negative for confusion, sleep disturbance and suicidal ideas. The patient is not nervous/anxious.          Current Outpatient Prescriptions:   •  atorvastatin (LIPITOR) 20 MG tablet, Take 20 mg by mouth Daily., Disp: , Rfl:   •  diphenoxylate-atropine (LOMOTIL) 2.5-0.025 MG per tablet, Take 1 tablet by mouth Every 6 (Six) Hours As Needed for Diarrhea. 1 tablet, Disp: 30 tablet, Rfl: 5  •  glipiZIDE (GLUCOTROL) 5 MG tablet, Take 5 mg by mouth 2 (Two) Times a Day Before Meals., Disp: , Rfl:   •  hydrOXYzine (ATARAX) 25 MG tablet, Take 25 mg by mouth 3 (Three) Times a Day As Needed for Itching., Disp: , Rfl:   •  levothyroxine (SYNTHROID) 100 MCG tablet, Take 1 tablet by mouth Daily., Disp: 30 tablet, Rfl: 5  •  lisinopril (PRINIVIL,ZESTRIL) 10 MG tablet, Take 1 tablet by mouth Daily., Disp: 30 tablet, Rfl: 2  •  ondansetron (ZOFRAN) 8 MG tablet, Take 1 tablet by mouth 3 (Three) Times a Day As Needed for Nausea or Vomiting., Disp: 30 tablet, Rfl: 5  •  oxyCODONE (ROXICODONE) 5 MG immediate release tablet, Take 1 tablet by mouth Every 6 (Six) Hours As Needed for Moderate Pain ., Disp: 120 tablet, Rfl: 0  •   predniSONE (DELTASONE) 10 MG tablet, Take 1 tablet by mouth Daily. 6 tabs daily for 1 week, 5 tabs for 1 week, 4 for 1 week, 3 for 1 week, 2 for 1 week, 1 for 1 week, stop, Disp: 189 tablet, Rfl: 0  •  zolpidem (AMBIEN) 5 MG tablet, Take 1 tablet by mouth At Night As Needed for Sleep., Disp: 30 tablet, Rfl: 2    PHYSICAL EXAMINATION:   BP (!) 187/89  Pulse 97  Temp 97.4 °F (36.3 °C) (Temporal Artery )   Resp 13  Wt 85.3 kg (188 lb)  BMI 28.59 kg/m2   ECOG Performance Status: 0 - Asymptomatic  General Appearance:  alert, cooperative, no apparent distress and appears stated age   Neurologic/Psychiatric: A&O x 3, gait steady, appropriate affect, strength 5/5 in all muscle groups   HEENT:  Normocephalic, without obvious abnormality, mucous membranes moist   Neck: Supple, symmetrical, trachea midline, no adenopathy;  No thyromegaly, masses, or tenderness   Lungs:   Clear to auscultation bilaterally; respirations regular, even, and unlabored bilaterally   Heart:  Regular rate and rhythm, no murmurs appreciated   Abdomen:   Soft, non-tender, non-distended and no organomegaly   Lymph nodes: No cervical, supraclavicular, inguinal or axillary adenopathy noted   Extremities: Normal, atraumatic; no clubbing, cyanosis, or edema    Skin: No rashes, ulcers, or suspicious lesions noted     No visits with results within 2 Week(s) from this visit.  Latest known visit with results is:    Lab on 12/06/2017   Component Date Value Ref Range Status   • TSH 12/06/2017 20.400* 0.470 - 4.680 mIU/mL Final   • Free T4 12/06/2017 1.05  0.78 - 2.19 ng/dL Final   • Glucose 12/06/2017 177* 74 - 98 mg/dL Final   • BUN 12/06/2017 3* 7 - 20 mg/dL Final   • Creatinine 12/06/2017 0.70  0.60 - 1.30 mg/dL Final   • Sodium 12/06/2017 137  137 - 145 mmol/L Final   • Potassium 12/06/2017 4.3  3.5 - 5.1 mmol/L Final   • Chloride 12/06/2017 97* 98 - 107 mmol/L Final   • CO2 12/06/2017 28.0  26.0 - 30.0 mmol/L Final   • Calcium 12/06/2017 9.2  8.4 - 10.2  mg/dL Final   • Total Protein 12/06/2017 8.2  6.3 - 8.2 g/dL Final   • Albumin 12/06/2017 4.30  3.50 - 5.00 g/dL Final   • ALT (SGPT) 12/06/2017 66  13 - 69 U/L Final   • AST (SGOT) 12/06/2017 146* 15 - 46 U/L Final   • Alkaline Phosphatase 12/06/2017 208* 38 - 126 U/L Final   • Total Bilirubin 12/06/2017 2.8* 0.2 - 1.3 mg/dL Final   • eGFR Non African Amer 12/06/2017 117  >60 mL/min/1.73 Final   • Globulin 12/06/2017 3.9  gm/dL Final   • A/G Ratio 12/06/2017 1.1  1.0 - 2.0 g/dL Final   • BUN/Creatinine Ratio 12/06/2017 4.3* 6.3 - 21.9 Final   • Anion Gap 12/06/2017 16.3  mmol/L Final   • WBC 12/06/2017 10.92* 4.80 - 10.80 10*3/mm3 Final   • RBC 12/06/2017 3.91* 4.70 - 6.10 10*6/mm3 Final   • Hemoglobin 12/06/2017 13.6* 14.0 - 18.0 g/dL Final   • Hematocrit 12/06/2017 39.6* 42.0 - 52.0 % Final   • MCV 12/06/2017 101.3* 80.0 - 94.0 fL Final   • MCH 12/06/2017 34.8* 27.0 - 31.0 pg Final   • MCHC 12/06/2017 34.3  30.0 - 37.0 g/dL Final   • RDW 12/06/2017 12.8  11.5 - 14.5 % Final   • RDW-SD 12/06/2017 47.7  37.0 - 54.0 fl Final   • MPV 12/06/2017 10.2  6.0 - 12.0 fL Final   • Platelets 12/06/2017 164  130 - 400 10*3/mm3 Final   • Neutrophil % 12/06/2017 74.9  37.0 - 80.0 % Final   • Lymphocyte % 12/06/2017 9.8* 10.0 - 50.0 % Final   • Monocyte % 12/06/2017 8.4  0.0 - 12.0 % Final   • Eosinophil % 12/06/2017 6.0  0.0 - 7.0 % Final   • Basophil % 12/06/2017 0.5  0.0 - 2.5 % Final   • Immature Grans % 12/06/2017 0.4  0.0 - 0.6 % Final   • Neutrophils, Absolute 12/06/2017 8.18* 2.00 - 6.90 10*3/mm3 Final   • Lymphocytes, Absolute 12/06/2017 1.07  0.60 - 3.40 10*3/mm3 Final   • Monocytes, Absolute 12/06/2017 0.92* 0.00 - 0.90 10*3/mm3 Final   • Eosinophils, Absolute 12/06/2017 0.65  0.00 - 0.70 10*3/mm3 Final   • Basophils, Absolute 12/06/2017 0.06  0.00 - 0.20 10*3/mm3 Final   • Immature Grans, Absolute 12/06/2017 0.04  0.00 - 0.06 10*3/mm3 Final   • nRBC 12/06/2017 0.0  0.0 - 0.0 /100 WBC Final        Ct Chest With  Contrast    Result Date: 12/5/2017  Narrative: EXAMINATION: CT CHEST W CONTRAST-, CT ABDOMEN PELVIS W CONTRAST- 12/04/2017  INDICATION: C77.9-Secondary and unspecified malignant neoplasm of lymph node, unspecified; metastatic melanoma  TECHNIQUE: Multiple axial CT imaging was obtained of the chest, abdomen and pelvis following the administration of intravenous contrast.  The radiation dose reduction device was turned on for each scan per the ALARA (As Low as Reasonably Achievable) protocol.  COMPARISON: 07/17/2017  FINDINGS:  CHEST: The thyroid is homogeneous in appearance. No mediastinal mass or adenopathy. The cardiac chambers are within normal limits. No pericardial effusion. Heterogeneous appearance identified of the liver. This is stable and unchanged when compared to the prior study. There is no dominant mass or lesion identified. Continued follow-up recommended. Degenerative changes seen within the spine. No bulky hilar or axillary lymphadenopathy. Thyroid is homogeneous.  The lung parenchyma reveals no parenchymal consolidation. Calcification seen in the left hilar region from old healed granulomatous disease. Calcified granuloma identified within the right lower lung. No definite pleural effusion or pneumothorax. No parenchymal consolidation. No pulmonary mass or nodule. Degenerative changes seen within the spine.  ABDOMEN: Again heterogeneity identified diffusely throughout the liver. There is no dominant underlying mass or lesion. Kidneys and adrenal glands within normal limits. The pancreas is homogeneous. No free fluid or free air. No stones in the gallbladder. The spleen is unremarkable. Abdominal portion of the gastrointestinal tract within normal limits. No free fluid or free air. No abnormal mass or fluid collections identified.  PELVIS: The pelvic organs are unremarkable in appearance. The pelvic portion of the gastrointestinal tract within normal limits. No free fluid or free air. No abnormal mass  or fluid collections identified. There is no pelvic adenopathy. Abnormal soft tissue density seen within the left inguinal region which when compared to the prior study is stable and unchanged. Several borderline enlarged lymph nodes as well seen in this area. There is no other pelvic adenopathy identified.      Impression: Stable abnormal soft tissue seen again in the left inguinal region. There is surrounding stranding again concerning for metastatic adenopathy. Findings are stable and unchanged. Remainder of the CT chest, abdomen and pelvis is unremarkable.  D:  12/05/2017 E:  12/05/2017   This report was finalized on 12/5/2017 2:14 PM by Dr. Carol Bernal MD.      Ct Abdomen Pelvis With Contrast    Result Date: 12/5/2017  Narrative: EXAMINATION: CT CHEST W CONTRAST-, CT ABDOMEN PELVIS W CONTRAST- 12/04/2017  INDICATION: C77.9-Secondary and unspecified malignant neoplasm of lymph node, unspecified; metastatic melanoma  TECHNIQUE: Multiple axial CT imaging was obtained of the chest, abdomen and pelvis following the administration of intravenous contrast.  The radiation dose reduction device was turned on for each scan per the ALARA (As Low as Reasonably Achievable) protocol.  COMPARISON: 07/17/2017  FINDINGS:  CHEST: The thyroid is homogeneous in appearance. No mediastinal mass or adenopathy. The cardiac chambers are within normal limits. No pericardial effusion. Heterogeneous appearance identified of the liver. This is stable and unchanged when compared to the prior study. There is no dominant mass or lesion identified. Continued follow-up recommended. Degenerative changes seen within the spine. No bulky hilar or axillary lymphadenopathy. Thyroid is homogeneous.  The lung parenchyma reveals no parenchymal consolidation. Calcification seen in the left hilar region from old healed granulomatous disease. Calcified granuloma identified within the right lower lung. No definite pleural effusion or pneumothorax. No  parenchymal consolidation. No pulmonary mass or nodule. Degenerative changes seen within the spine.  ABDOMEN: Again heterogeneity identified diffusely throughout the liver. There is no dominant underlying mass or lesion. Kidneys and adrenal glands within normal limits. The pancreas is homogeneous. No free fluid or free air. No stones in the gallbladder. The spleen is unremarkable. Abdominal portion of the gastrointestinal tract within normal limits. No free fluid or free air. No abnormal mass or fluid collections identified.  PELVIS: The pelvic organs are unremarkable in appearance. The pelvic portion of the gastrointestinal tract within normal limits. No free fluid or free air. No abnormal mass or fluid collections identified. There is no pelvic adenopathy. Abnormal soft tissue density seen within the left inguinal region which when compared to the prior study is stable and unchanged. Several borderline enlarged lymph nodes as well seen in this area. There is no other pelvic adenopathy identified.      Impression: Stable abnormal soft tissue seen again in the left inguinal region. There is surrounding stranding again concerning for metastatic adenopathy. Findings are stable and unchanged. Remainder of the CT chest, abdomen and pelvis is unremarkable.  D:  12/05/2017 E:  12/05/2017   This report was finalized on 12/5/2017 2:14 PM by Dr. Carol Bernal MD.        ASSESSMENT: The patient is a very pleasant 56 y.o. male with metastatic melanoma     PROBLEM LIST:   1. Metastatic melanoma diagnosed after ultrasound-guided biopsy left inguinal lymph node March 2, 2017.  TX NX M1 B.  Disease burden left inguinal and left iliac chain lymphadenopathy.  2. Remote history of melanoma left check and left lower lip 12 and 13 years ago.  3. Started Opdivo plus Yervoy April 12, 2017, status post 3 cycles.  This was stopped secondary to colitis.   4.  Started Opdivo single agent July 26, 2017 status post 1 cycle  5.  Treatment  induced colitis, grade 3, resolved  6.  Treatment induced dermatitis, grade 2  7.  Treatment induced hepatitis, grade 2   8 .  Treatment-induced thyroiditis, grade 2   9.  Alcohol abuse  10.  Uncontrolled type 2 diabetes  11. Hypertension  12. Cancer related pain    PLAN:  1.  I did go over the blood work results with the patient and his sister reassured him that his free T4 is back to normal, his TSH is gradually improving, his liver enzymes are significantly better but he continued to have elevated bilirubin.  2.  The patient would  like to continue to hold treatment at this point.    3.  We discussed potential side effects of immunotherapy including but not limited to immune mediated reactions with thyroiditis, pneumonitis, hepatitis, colitis, rash, and electrolytes abnormalities, fatigue, multiorgan failure, and possibly death.  4.  I will refill his Percocet 5/325 mg oxycodone 5 mg every 6 hours as needed for cancer-related pain.  I was given a new prescription today.  5.  The patient follow with me in 4 weeks repeated labs.  6.  I'll be monitoring patient blood counts kidney function liver function as well as thyroid function.  7.  I will continue the patient on Reglan 5 mg 3 times a day as well as Zofran 8 mg every 6 hours as needed for nausea.  8.   Patient will follow-up with Dr. Chacko for uncontrolled type 2 diabetes.  9.  I will continue the patient on Imodium as well as Lomotil as needed for diarrhea.  10.  I'll continue the patient on Benadryl as needed for itching  11.  I will continue patient on Zofran as needed for nausea.  12.   I will do a repeat CAT scan in 3 months which would be due March 2018.  I will consider restarting immunotherapy if his liver enzymes are better and he has evidence of progressive disease on his scans.  13.  I will continue patient on Ambien as needed for insomnia.  14.  I will increase Synthroid 200 mg daily.  15.  I will refill his lisinopril daily for  hypertension.  16.  I will refer patient see Dr. Velez for dermatology consult.  I explained to the patient that he needs to keep up with his dermatologist appointments for full skin exam.  Kavitha Martins MD    1/3/2018     Scribed for Kavitha Martins MD by Andressa LOYD. 1/3/2018  10:45 AM  I, Kavitha Martins MD, personally performed the services described in this documentation as scribed by the above named individual in my presence, and it is both accurate and complete.  1/3/2018  10:45 AM

## 2018-01-31 NOTE — PROGRESS NOTES
DATE OF VISIT: 1/31/2018    REASON FOR VISIT: Followup for Metastatic melanoma with spindle cell features      HISTORY OF PRESENT ILLNESS: The patient is a very pleasant 57 y.o. male  who was in his usual state of health until approximately February 2017. The patient presented with mass in his left groin, this has been gradually increasing in size. Patient never had this problem before. This is associated with mild pain get worse with activity and improves with laying flat. Patient went and so his primary care provider who referred him to see his surgeon. Patient so Dr. Wilson on February 28, 2017. Patient had a biopsy done March 3, 2017 that revealed metastatic melanoma.  The patient had CAT scan abdomen and pelvis done February 12, 2017 revealed left inguinal and iliac chain adenopathy.  Unfortunately insurance had declined whole body PET scan as well as MRI brain for staging purposes and there was no option.  The patient was started on Opdivo plus Yervoy on April 12, 2017. He completed 3 cycles and last dose was canceled secondary to colitis, that resolved after a short course of steroids.  He was started on Opdivo single agent July 26, 2017. He had lost to follow-up.  His repeat blood work on return showed hepatitis and he was started on another round of tapered prednisone.  The patient is here today for scheduled follow-up visit.    SUBJECTIVE: The lower dizziness today with his sister.  He is complaining of persistent nausea and sometimes vomiting Zofran is not helping enough.  He denied any headaches denied any numbness or weakness.  He is requesting refill his pain medicine.    PAST MEDICAL HISTORY/SOCIAL HISTORY/FAMILY HISTORY: Unchanged from my prior documentation done on March 27 2017    Review of Systems   Constitutional: Negative for activity change, appetite change, chills, fatigue, fever and unexpected weight change.   HENT: Negative for hearing loss, mouth sores, nosebleeds, sore throat and trouble  swallowing.    Eyes: Negative for visual disturbance.   Respiratory: Negative for cough, chest tightness, shortness of breath and wheezing.    Cardiovascular: Negative for chest pain, palpitations and leg swelling.   Gastrointestinal: Negative for abdominal distention, abdominal pain, blood in stool, constipation, diarrhea, nausea, rectal pain and vomiting.   Endocrine: Negative for cold intolerance and heat intolerance.   Genitourinary: Negative for difficulty urinating, dysuria, frequency and urgency.   Musculoskeletal: Negative for arthralgias, back pain, gait problem, joint swelling and myalgias.   Skin: Negative for rash.   Neurological: Negative for dizziness, tremors, syncope, weakness, light-headedness, numbness and headaches.   Hematological: Negative for adenopathy. Does not bruise/bleed easily.   Psychiatric/Behavioral: Negative for confusion, sleep disturbance and suicidal ideas. The patient is not nervous/anxious.          Current Outpatient Prescriptions:   •  atorvastatin (LIPITOR) 20 MG tablet, Take 20 mg by mouth Daily., Disp: , Rfl:   •  diphenoxylate-atropine (LOMOTIL) 2.5-0.025 MG per tablet, Take 1 tablet by mouth Every 6 (Six) Hours As Needed for Diarrhea. 1 tablet, Disp: 30 tablet, Rfl: 5  •  glipiZIDE (GLUCOTROL) 5 MG tablet, Take 5 mg by mouth 2 (Two) Times a Day Before Meals., Disp: , Rfl:   •  hydrOXYzine (ATARAX) 25 MG tablet, Take 25 mg by mouth 3 (Three) Times a Day As Needed for Itching., Disp: , Rfl:   •  levothyroxine (SYNTHROID) 100 MCG tablet, Take 1 tablet by mouth Daily., Disp: 30 tablet, Rfl: 5  •  lisinopril (PRINIVIL,ZESTRIL) 10 MG tablet, Take 1 tablet by mouth Daily., Disp: 30 tablet, Rfl: 2  •  ondansetron (ZOFRAN) 8 MG tablet, Take 1 tablet by mouth 3 (Three) Times a Day As Needed for Nausea or Vomiting., Disp: 30 tablet, Rfl: 5  •  oxyCODONE (ROXICODONE) 5 MG immediate release tablet, Take 1 tablet by mouth Every 6 (Six) Hours As Needed for Moderate Pain ., Disp: 120  tablet, Rfl: 0  •  predniSONE (DELTASONE) 10 MG tablet, Take 1 tablet by mouth Daily. 6 tabs daily for 1 week, 5 tabs for 1 week, 4 for 1 week, 3 for 1 week, 2 for 1 week, 1 for 1 week, stop, Disp: 189 tablet, Rfl: 0  •  zolpidem (AMBIEN) 5 MG tablet, Take 1 tablet by mouth At Night As Needed for Sleep., Disp: 30 tablet, Rfl: 2    PHYSICAL EXAMINATION:   BP (!) 197/98  Pulse 106  Temp 97.5 °F (36.4 °C) (Temporal Artery )   Resp 20  Wt 85.3 kg (188 lb)  BMI 28.59 kg/m2   ECOG Performance Status: 0 - Asymptomatic  General Appearance:  alert, cooperative, no apparent distress and appears stated age   Neurologic/Psychiatric: A&O x 3, gait steady, appropriate affect, strength 5/5 in all muscle groups   HEENT:  Normocephalic, without obvious abnormality, mucous membranes moist   Neck: Supple, symmetrical, trachea midline, no adenopathy;  No thyromegaly, masses, or tenderness   Lungs:   Clear to auscultation bilaterally; respirations regular, even, and unlabored bilaterally   Heart:  Regular rate and rhythm, no murmurs appreciated   Abdomen:   Soft, non-tender, non-distended and no organomegaly   Lymph nodes: No cervical, supraclavicular, inguinal or axillary adenopathy noted   Extremities: Normal, atraumatic; no clubbing, cyanosis, or edema    Skin: No rashes, ulcers, or suspicious lesions noted     No visits with results within 2 Week(s) from this visit.  Latest known visit with results is:    Lab on 12/06/2017   Component Date Value Ref Range Status   • TSH 12/06/2017 20.400* 0.470 - 4.680 mIU/mL Final   • Free T4 12/06/2017 1.05  0.78 - 2.19 ng/dL Final   • Glucose 12/06/2017 177* 74 - 98 mg/dL Final   • BUN 12/06/2017 3* 7 - 20 mg/dL Final   • Creatinine 12/06/2017 0.70  0.60 - 1.30 mg/dL Final   • Sodium 12/06/2017 137  137 - 145 mmol/L Final   • Potassium 12/06/2017 4.3  3.5 - 5.1 mmol/L Final   • Chloride 12/06/2017 97* 98 - 107 mmol/L Final   • CO2 12/06/2017 28.0  26.0 - 30.0 mmol/L Final   • Calcium  12/06/2017 9.2  8.4 - 10.2 mg/dL Final   • Total Protein 12/06/2017 8.2  6.3 - 8.2 g/dL Final   • Albumin 12/06/2017 4.30  3.50 - 5.00 g/dL Final   • ALT (SGPT) 12/06/2017 66  13 - 69 U/L Final   • AST (SGOT) 12/06/2017 146* 15 - 46 U/L Final   • Alkaline Phosphatase 12/06/2017 208* 38 - 126 U/L Final   • Total Bilirubin 12/06/2017 2.8* 0.2 - 1.3 mg/dL Final   • eGFR Non African Amer 12/06/2017 117  >60 mL/min/1.73 Final   • Globulin 12/06/2017 3.9  gm/dL Final   • A/G Ratio 12/06/2017 1.1  1.0 - 2.0 g/dL Final   • BUN/Creatinine Ratio 12/06/2017 4.3* 6.3 - 21.9 Final   • Anion Gap 12/06/2017 16.3  mmol/L Final   • WBC 12/06/2017 10.92* 4.80 - 10.80 10*3/mm3 Final   • RBC 12/06/2017 3.91* 4.70 - 6.10 10*6/mm3 Final   • Hemoglobin 12/06/2017 13.6* 14.0 - 18.0 g/dL Final   • Hematocrit 12/06/2017 39.6* 42.0 - 52.0 % Final   • MCV 12/06/2017 101.3* 80.0 - 94.0 fL Final   • MCH 12/06/2017 34.8* 27.0 - 31.0 pg Final   • MCHC 12/06/2017 34.3  30.0 - 37.0 g/dL Final   • RDW 12/06/2017 12.8  11.5 - 14.5 % Final   • RDW-SD 12/06/2017 47.7  37.0 - 54.0 fl Final   • MPV 12/06/2017 10.2  6.0 - 12.0 fL Final   • Platelets 12/06/2017 164  130 - 400 10*3/mm3 Final   • Neutrophil % 12/06/2017 74.9  37.0 - 80.0 % Final   • Lymphocyte % 12/06/2017 9.8* 10.0 - 50.0 % Final   • Monocyte % 12/06/2017 8.4  0.0 - 12.0 % Final   • Eosinophil % 12/06/2017 6.0  0.0 - 7.0 % Final   • Basophil % 12/06/2017 0.5  0.0 - 2.5 % Final   • Immature Grans % 12/06/2017 0.4  0.0 - 0.6 % Final   • Neutrophils, Absolute 12/06/2017 8.18* 2.00 - 6.90 10*3/mm3 Final   • Lymphocytes, Absolute 12/06/2017 1.07  0.60 - 3.40 10*3/mm3 Final   • Monocytes, Absolute 12/06/2017 0.92* 0.00 - 0.90 10*3/mm3 Final   • Eosinophils, Absolute 12/06/2017 0.65  0.00 - 0.70 10*3/mm3 Final   • Basophils, Absolute 12/06/2017 0.06  0.00 - 0.20 10*3/mm3 Final   • Immature Grans, Absolute 12/06/2017 0.04  0.00 - 0.06 10*3/mm3 Final   • nRBC 12/06/2017 0.0  0.0 - 0.0 /100 WBC Final         No results found.    ASSESSMENT: The patient is a very pleasant 56 y.o. male with metastatic melanoma     PROBLEM LIST:   1. Metastatic melanoma diagnosed after ultrasound-guided biopsy left inguinal lymph node March 2, 2017.  TX NX M1 B.  Disease burden left inguinal and left iliac chain lymphadenopathy.  2. Remote history of melanoma left check and left lower lip 12 and 13 years ago.  3. Started Opdivo plus Yervoy April 12, 2017, status post 3 cycles.  This was stopped secondary to colitis.   4.  Started Opdivo single agent July 26, 2017 status post 1 cycle  5.  Treatment induced colitis, grade 3, resolved  6.  Treatment induced dermatitis, grade 2  7.  Treatment induced hepatitis, grade 2   8 .  Treatment-induced thyroiditis, grade 2   9.   Alcohol abuse  10.  Uncontrolled type 2 diabetes  11. Hypertension  12. Cancer related pain  13.  Nausea and vomiting    PLAN:  1.  I did go over the blood work results with the patient and his sister from last visit. I reassured him that his free T4 is back to normal, his TSH is gradually improving, his liver enzymes are significantly better but he continued to have slightly elevated bilirubin.  2.  The patient would  like to continue to hold treatment at this point.    3.  We discussed potential side effects of immunotherapy including but not limited to immune mediated reactions with thyroiditis, pneumonitis, hepatitis, colitis, rash, and electrolytes abnormalities, fatigue, multiorgan failure, and possibly death.  4.  I will refill his Percocet 5/325 mg oxycodone 5 mg every 6 hours as needed for cancer-related pain.  I was given a new prescription today.  5.  The patient will follow up with me in 4 weeks repeated labs.  6.  I'll be monitoring patient blood counts kidney function liver function as well as thyroid function.  7.  I will continue the patient on Reglan 5 mg 3 times a day as well as Zofran 8 mg every 6 hours as needed for nausea.  I will add Phenergan as  needed for persistent nausea.  8.   Patient will follow-up with Dr. Chacko for uncontrolled type 2 diabetes.  9.  I will continue the patient on Imodium as well as Lomotil as needed for diarrhea.  10.  I'll continue the patient on Benadryl as needed for itching  11.  I will continue patient on Zofran as needed for nausea.  12.   I will do a repeat CAT scan in 3 months which would be due prior to return.  I will consider restarting immunotherapy if his liver enzymes are better and he has evidence of progressive disease on his scans.  13.  I will continue patient on Ambien as needed for insomnia.  14.  I will increase Synthroid 200 mg daily.  15.  I will refill his lisinopril daily for hypertension.  16.  I will refer patient see Dr. Velez for dermatology consult.  I explained to the patient that he needs to keep up with his dermatologist appointments for full skin exam.  Kavitha Martins MD  1/31/2018  10:14 AM

## 2018-02-05 NOTE — TELEPHONE ENCOUNTER
Liver enzymes elevated. VM left for Rosangela, advising of need for prednisone taper and to avoid tylenol.   Prednisone sent to pharmacy

## 2018-02-28 NOTE — PROGRESS NOTES
DATE OF VISIT: 2/28/2018    REASON FOR VISIT: Followup for Metastatic melanoma with spindle cell features      HISTORY OF PRESENT ILLNESS: The patient is a very pleasant 57 y.o. male  who was in his usual state of health until approximately February 2017. The patient presented with mass in his left groin, this has been gradually increasing in size. Patient never had this problem before. This is associated with mild pain get worse with activity and improves with laying flat. Patient went and so his primary care provider who referred him to see his surgeon, Dr. Wilson on February 28, 2017. Patient had a biopsy done March 3, 2017 that revealed metastatic melanoma.  The patient had CAT scan abdomen and pelvis done February 12, 2017 revealed left inguinal and iliac chain adenopathy.  Unfortunately insurance had declined whole body PET scan as well as MRI brain for staging purposes and there was no option.  The patient was started on Opdivo plus Yervoy on April 12, 2017. He completed 3 cycles and last dose was canceled secondary to colitis, that resolved after a short course of steroids.  He was started on Opdivo single agent July 26, 2017. He had lost to follow-up.  His repeat blood work on return showed hepatitis and he was started on another round of tapered prednisone.  The patient is here today for scheduled follow-up visit.    SUBJECTIVE: He has recently recovered from the Flu.  He did not get his scans due to sickness.  He is complaining of persistent nausea and sometimes vomiting but has recently been manageable with Zofran and phenergan.  He denied any headaches denied any numbness or weakness.  He is requesting refill his pain medicine.    PAST MEDICAL HISTORY/SOCIAL HISTORY/FAMILY HISTORY: Unchanged from my prior documentation done on March 27 2017    Review of Systems   Constitutional: Negative for activity change, appetite change, chills, fatigue, fever and unexpected weight change.   HENT: Negative for  hearing loss, mouth sores, nosebleeds, sore throat and trouble swallowing.    Eyes: Negative for visual disturbance.   Respiratory: Negative for cough, chest tightness, shortness of breath and wheezing.    Cardiovascular: Negative for chest pain, palpitations and leg swelling.   Gastrointestinal: Negative for abdominal distention, abdominal pain, blood in stool, constipation, diarrhea, nausea, rectal pain and vomiting.   Endocrine: Negative for cold intolerance and heat intolerance.   Genitourinary: Negative for difficulty urinating, dysuria, frequency and urgency.   Musculoskeletal: Negative for arthralgias, back pain, gait problem, joint swelling and myalgias.   Skin: Negative for rash.   Neurological: Negative for dizziness, tremors, syncope, weakness, light-headedness, numbness and headaches.   Hematological: Negative for adenopathy. Does not bruise/bleed easily.   Psychiatric/Behavioral: Negative for confusion, sleep disturbance and suicidal ideas. The patient is not nervous/anxious.          Current Outpatient Prescriptions:   •  atorvastatin (LIPITOR) 20 MG tablet, Take 20 mg by mouth Daily., Disp: , Rfl:   •  diphenoxylate-atropine (LOMOTIL) 2.5-0.025 MG per tablet, Take 1 tablet by mouth Every 6 (Six) Hours As Needed for Diarrhea. 1 tablet, Disp: 30 tablet, Rfl: 5  •  glipiZIDE (GLUCOTROL) 5 MG tablet, Take 5 mg by mouth 2 (Two) Times a Day Before Meals., Disp: , Rfl:   •  hydrOXYzine (ATARAX) 25 MG tablet, Take 25 mg by mouth 3 (Three) Times a Day As Needed for Itching., Disp: , Rfl:   •  levothyroxine (SYNTHROID) 100 MCG tablet, Take 1 tablet by mouth Daily., Disp: 30 tablet, Rfl: 5  •  lisinopril (PRINIVIL,ZESTRIL) 10 MG tablet, Take 1 tablet by mouth Daily., Disp: 30 tablet, Rfl: 2  •  ondansetron (ZOFRAN) 8 MG tablet, Take 1 tablet by mouth 3 (Three) Times a Day As Needed for Nausea or Vomiting., Disp: 30 tablet, Rfl: 5  •  oxyCODONE (ROXICODONE) 5 MG immediate release tablet, Take 1 tablet by mouth  Every 6 (Six) Hours As Needed for Moderate Pain ., Disp: 120 tablet, Rfl: 0  •  predniSONE (DELTASONE) 10 MG tablet, Take 1 tablet by mouth Daily. 4 tabs daily for 5 days, 3 tabs for 5 days, 2 for 5 days, 1 for days, then stop, Disp: 45 tablet, Rfl: 0  •  promethazine (PHENERGAN) 25 MG tablet, Take 1 tablet by mouth Every 6 (Six) Hours As Needed for Nausea or Vomiting., Disp: 45 tablet, Rfl: 5  •  zolpidem (AMBIEN) 5 MG tablet, Take 1 tablet by mouth At Night As Needed for Sleep., Disp: 30 tablet, Rfl: 2    PHYSICAL EXAMINATION:   BP (!) 190/92  Pulse 101  Temp 97.7 °F (36.5 °C) (Temporal Artery )   Resp 17  Wt 84.4 kg (186 lb)  BMI 28.28 kg/m2   ECOG Performance Status: 0 - Asymptomatic  General Appearance:  alert, cooperative, no apparent distress and appears stated age   Neurologic/Psychiatric: A&O x 3, gait steady, appropriate affect, strength 5/5 in all muscle groups   HEENT:  Normocephalic, without obvious abnormality, mucous membranes moist   Neck: Supple, symmetrical, trachea midline, no adenopathy;  No thyromegaly, masses, or tenderness   Lungs:   Clear to auscultation bilaterally; respirations regular, even, and unlabored bilaterally   Heart:  Regular rate and rhythm, no murmurs appreciated   Abdomen:   Soft, non-tender, non-distended and no organomegaly   Lymph nodes: No cervical, supraclavicular, inguinal or axillary adenopathy noted   Extremities: Normal, atraumatic; no clubbing, cyanosis, or edema    Skin: No rashes, ulcers, or suspicious lesions noted     No visits with results within 2 Week(s) from this visit.  Latest known visit with results is:    Lab on 12/06/2017   Component Date Value Ref Range Status   • TSH 12/06/2017 20.400* 0.470 - 4.680 mIU/mL Final   • Free T4 12/06/2017 1.05  0.78 - 2.19 ng/dL Final   • Glucose 12/06/2017 177* 74 - 98 mg/dL Final   • BUN 12/06/2017 3* 7 - 20 mg/dL Final   • Creatinine 12/06/2017 0.70  0.60 - 1.30 mg/dL Final   • Sodium 12/06/2017 137  137 - 145  mmol/L Final   • Potassium 12/06/2017 4.3  3.5 - 5.1 mmol/L Final   • Chloride 12/06/2017 97* 98 - 107 mmol/L Final   • CO2 12/06/2017 28.0  26.0 - 30.0 mmol/L Final   • Calcium 12/06/2017 9.2  8.4 - 10.2 mg/dL Final   • Total Protein 12/06/2017 8.2  6.3 - 8.2 g/dL Final   • Albumin 12/06/2017 4.30  3.50 - 5.00 g/dL Final   • ALT (SGPT) 12/06/2017 66  13 - 69 U/L Final   • AST (SGOT) 12/06/2017 146* 15 - 46 U/L Final   • Alkaline Phosphatase 12/06/2017 208* 38 - 126 U/L Final   • Total Bilirubin 12/06/2017 2.8* 0.2 - 1.3 mg/dL Final   • eGFR Non African Amer 12/06/2017 117  >60 mL/min/1.73 Final   • Globulin 12/06/2017 3.9  gm/dL Final   • A/G Ratio 12/06/2017 1.1  1.0 - 2.0 g/dL Final   • BUN/Creatinine Ratio 12/06/2017 4.3* 6.3 - 21.9 Final   • Anion Gap 12/06/2017 16.3  mmol/L Final   • WBC 12/06/2017 10.92* 4.80 - 10.80 10*3/mm3 Final   • RBC 12/06/2017 3.91* 4.70 - 6.10 10*6/mm3 Final   • Hemoglobin 12/06/2017 13.6* 14.0 - 18.0 g/dL Final   • Hematocrit 12/06/2017 39.6* 42.0 - 52.0 % Final   • MCV 12/06/2017 101.3* 80.0 - 94.0 fL Final   • MCH 12/06/2017 34.8* 27.0 - 31.0 pg Final   • MCHC 12/06/2017 34.3  30.0 - 37.0 g/dL Final   • RDW 12/06/2017 12.8  11.5 - 14.5 % Final   • RDW-SD 12/06/2017 47.7  37.0 - 54.0 fl Final   • MPV 12/06/2017 10.2  6.0 - 12.0 fL Final   • Platelets 12/06/2017 164  130 - 400 10*3/mm3 Final   • Neutrophil % 12/06/2017 74.9  37.0 - 80.0 % Final   • Lymphocyte % 12/06/2017 9.8* 10.0 - 50.0 % Final   • Monocyte % 12/06/2017 8.4  0.0 - 12.0 % Final   • Eosinophil % 12/06/2017 6.0  0.0 - 7.0 % Final   • Basophil % 12/06/2017 0.5  0.0 - 2.5 % Final   • Immature Grans % 12/06/2017 0.4  0.0 - 0.6 % Final   • Neutrophils, Absolute 12/06/2017 8.18* 2.00 - 6.90 10*3/mm3 Final   • Lymphocytes, Absolute 12/06/2017 1.07  0.60 - 3.40 10*3/mm3 Final   • Monocytes, Absolute 12/06/2017 0.92* 0.00 - 0.90 10*3/mm3 Final   • Eosinophils, Absolute 12/06/2017 0.65  0.00 - 0.70 10*3/mm3 Final   •  Basophils, Absolute 12/06/2017 0.06  0.00 - 0.20 10*3/mm3 Final   • Immature Grans, Absolute 12/06/2017 0.04  0.00 - 0.06 10*3/mm3 Final   • nRBC 12/06/2017 0.0  0.0 - 0.0 /100 WBC Final        No results found.    ASSESSMENT: The patient is a very pleasant 56 y.o. male with metastatic melanoma     PROBLEM LIST:   1. Metastatic melanoma diagnosed after ultrasound-guided biopsy left inguinal lymph node March 2, 2017.  TX NX M1 B.  Disease burden left inguinal and left iliac chain lymphadenopathy.  2. Remote history of melanoma left check and left lower lip 12 and 13 years ago.  3. Started Opdivo plus Yervoy April 12, 2017, status post 3 cycles.  This was stopped secondary to colitis.   4.  Started Opdivo single agent July 26, 2017 status post 1 cycle  5.  Treatment induced colitis, grade 3, resolved  6.  Treatment induced dermatitis, grade 2  7.  Treatment induced hepatitis, grade 2   8 .  Treatment-induced thyroiditis, grade 2   9.   Alcohol abuse  10.  Uncontrolled type 2 diabetes  11. Hypertension  12. Cancer related pain  13.  Nausea and vomiting    PLAN:  1.  I did go over the blood work results with the patient and his sister from last visit. I reassured him that  his liver enzymes are  better but he continued to have  elevated bilirubin. He recently completed a steroid taper.  We will continue 10 mg until CMP is done. I will call him with results and insturctions on taper.   2.  The patient would  like to continue to hold treatment at this point.    3.  We discussed potential side effects of immunotherapy including but not limited to immune mediated reactions with thyroiditis, pneumonitis, hepatitis, colitis, rash, and electrolytes abnormalities, fatigue, multiorgan failure, and possibly death.  4.  I will refill his Percocet 5/325 mg oxycodone 5 mg every 6 hours as needed for cancer-related pain.  pt was given a new prescription today.  5.  The patient will follow up with me in 4 weeks repeated labs and  rescheduled scans.  6.  I'll be monitoring patient blood counts kidney function liver function as well as thyroid function.  7.  I will continue the patient on Reglan 5 mg 3 times a day as well as Zofran 8 mg every 6 hours as needed for nausea. We will continue Phenergan as needed for persistent nausea.  8.   Patient will follow-up with Dr. Chacko for uncontrolled type 2 diabetes.  His last appointment was cancelled and rescheduled due to provider illness.  9.  I will continue the patient on Imodium as well as Lomotil as needed for diarrhea.  10.  I'll continue the patient on Benadryl as needed for itching  11.  I will continue patient on Zofran as needed for nausea.  12.   I will reschedule CAT scan prior to return.  We will consider restarting immunotherapy if his liver enzymes are better and he has evidence of progressive disease on his scans.  13.  I will continue patient on Ambien as needed for insomnia.  14.  I will continue Synthroid 200 mg daily.  15.  I will refill his lisinopril daily for hypertension.  16.  Patient will keep scheduled consult with  Dr. Velez for dermatology consult. I again reviewed with the patient  that he needs to keep up with his dermatologist appointments for full skin exam.    Andressa Mazariegos APRN  2/28/2018  10:26 AM

## 2018-03-06 NOTE — TELEPHONE ENCOUNTER
Notified Mr. Arroyo that his bilirubin is still elevated.  We discussed I sent him in a new prescription for prednisone taper.  I educated him that he would need to do a prednisone taper starting with 40mg for 7 days, 30mg for 7 days, 20mg for 7 days, and 10mg for 7 days.  At that point we will recheck labs.

## 2018-03-28 NOTE — PROGRESS NOTES
DATE OF VISIT: 3/28/2018    REASON FOR VISIT: Followup for Metastatic melanoma with spindle cell features      HISTORY OF PRESENT ILLNESS: The patient is a very pleasant 57 y.o. male  who was in his usual state of health until approximately February 2017. The patient presented with mass in his left groin, this has been gradually increasing in size. Patient never had this problem before. This is associated with mild pain get worse with activity and improves with laying flat. Patient went and so his primary care provider who referred him to see his surgeon. Patient so Dr. Wilson on February 28, 2017. Patient had a biopsy done March 3, 2017 that revealed metastatic melanoma.  The patient had CAT scan abdomen and pelvis done February 12, 2017 revealed left inguinal and iliac chain adenopathy.  Unfortunately insurance had declined whole body PET scan as well as MRI brain for staging purposes and there was no option.  The patient was started on Opdivo plus Yervoy on April 12, 2017. He completed 3 cycles and last dose was canceled secondary to colitis, that resolved after a short course of steroids.  He was started on Opdivo single agent July 26, 2017. He had lost to follow-up.  His repeat blood work on return showed hepatitis and he was started on another round of tapered prednisone.  The patient is here today for scheduled follow-up visit.    SUBJECTIVE: The patient has been feeling better since he started steroids for his itching has improved.  He denied fever chills.  He is complaining of mild back pain.  He is requesting refill on pain medicine.    PAST MEDICAL HISTORY/SOCIAL HISTORY/FAMILY HISTORY: Unchanged from my prior documentation done on March 27 2017    Review of Systems   Constitutional: Negative for activity change, appetite change, chills, fatigue, fever and unexpected weight change.   HENT: Negative for hearing loss, mouth sores, nosebleeds, sore throat and trouble swallowing.    Eyes: Negative for visual  disturbance.   Respiratory: Negative for cough, chest tightness, shortness of breath and wheezing.    Cardiovascular: Negative for chest pain, palpitations and leg swelling.   Gastrointestinal: Negative for abdominal distention, abdominal pain, blood in stool, constipation, diarrhea, nausea, rectal pain and vomiting.   Endocrine: Negative for cold intolerance and heat intolerance.   Genitourinary: Negative for difficulty urinating, dysuria, frequency and urgency.   Musculoskeletal: Negative for arthralgias, back pain, gait problem, joint swelling and myalgias.   Skin: Negative for rash.   Neurological: Negative for dizziness, tremors, syncope, weakness, light-headedness, numbness and headaches.   Hematological: Negative for adenopathy. Does not bruise/bleed easily.   Psychiatric/Behavioral: Negative for confusion, sleep disturbance and suicidal ideas. The patient is not nervous/anxious.          Current Outpatient Prescriptions:   •  atorvastatin (LIPITOR) 20 MG tablet, Take 20 mg by mouth Daily., Disp: , Rfl:   •  diphenoxylate-atropine (LOMOTIL) 2.5-0.025 MG per tablet, Take 1 tablet by mouth Every 6 (Six) Hours As Needed for Diarrhea. 1 tablet, Disp: 30 tablet, Rfl: 5  •  glipiZIDE (GLUCOTROL) 5 MG tablet, Take 5 mg by mouth 2 (Two) Times a Day Before Meals., Disp: , Rfl:   •  hydrOXYzine (ATARAX) 25 MG tablet, Take 25 mg by mouth 3 (Three) Times a Day As Needed for Itching., Disp: , Rfl:   •  levothyroxine (SYNTHROID) 100 MCG tablet, Take 1 tablet by mouth Daily., Disp: 30 tablet, Rfl: 5  •  lisinopril (PRINIVIL,ZESTRIL) 10 MG tablet, TAKE 1 TABLET BY MOUTH DAILY., Disp: 30 tablet, Rfl: 5  •  ondansetron (ZOFRAN) 8 MG tablet, Take 1 tablet by mouth 3 (Three) Times a Day As Needed for Nausea or Vomiting., Disp: 30 tablet, Rfl: 5  •  oxyCODONE (ROXICODONE) 5 MG immediate release tablet, Take 1 tablet by mouth Every 6 (Six) Hours As Needed for Moderate Pain ., Disp: 120 tablet, Rfl: 0  •  predniSONE (DELTASONE) 10  MG tablet, Take 1 tablet by mouth Daily. 4 tabs daily for 7 days, 3 tabs for 7 days, 2 for 7 days, 1 for 7 days, then stop, Disp: 45 tablet, Rfl: 0  •  promethazine (PHENERGAN) 25 MG tablet, Take 1 tablet by mouth Every 6 (Six) Hours As Needed for Nausea or Vomiting., Disp: 45 tablet, Rfl: 5  •  zolpidem (AMBIEN) 5 MG tablet, Take 1 tablet by mouth At Night As Needed for Sleep., Disp: 30 tablet, Rfl: 2    PHYSICAL EXAMINATION:   There were no vitals taken for this visit.   ECOG Performance Status: 0 - Asymptomatic  General Appearance:  alert, cooperative, no apparent distress and appears stated age   Neurologic/Psychiatric: A&O x 3, gait steady, appropriate affect, strength 5/5 in all muscle groups   HEENT:  Normocephalic, without obvious abnormality, mucous membranes moist   Neck: Supple, symmetrical, trachea midline, no adenopathy;  No thyromegaly, masses, or tenderness   Lungs:   Clear to auscultation bilaterally; respirations regular, even, and unlabored bilaterally   Heart:  Regular rate and rhythm, no murmurs appreciated   Abdomen:   Soft, non-tender, non-distended and no organomegaly   Lymph nodes: No cervical, supraclavicular, inguinal or axillary adenopathy noted   Extremities: Normal, atraumatic; no clubbing, cyanosis, or edema    Skin: No rashes, ulcers, or suspicious lesions noted     Orders Only on 03/21/2018   Component Date Value Ref Range Status   • WBC 03/21/2018 7.39  4.80 - 10.80 10*3/mm3 Final   • RBC 03/21/2018 4.10* 4.70 - 6.10 10*6/mm3 Final   • Hemoglobin 03/21/2018 13.8* 14.0 - 18.0 g/dL Final   • Hematocrit 03/21/2018 41.0* 42.0 - 52.0 % Final   • MCV 03/21/2018 100.0* 80.0 - 94.0 fL Final   • MCH 03/21/2018 33.7* 27.0 - 31.0 pg Final   • MCHC 03/21/2018 33.7  30.0 - 37.0 g/dL Final   • RDW 03/21/2018 13.6  11.5 - 14.5 % Final   • Platelets 03/21/2018 166  130 - 400 10*3/mm3 Final   • Neutrophil Rel % 03/21/2018 72.1  37.0 - 80.0 % Final   • Lymphocyte Rel % 03/21/2018 13.5  10.0 - 50.0 %  Final   • Monocyte Rel % 03/21/2018 9.7  0.0 - 12.0 % Final   • Eosinophil Rel % 03/21/2018 3.4  0.0 - 7.0 % Final   • Basophil Rel % 03/21/2018 0.8  0.0 - 2.5 % Final   • Neutrophils Absolute 03/21/2018 5.32  2.00 - 6.90 10*3/mm3 Final   • Lymphocytes Absolute 03/21/2018 1.00  0.60 - 3.40 10*3/mm3 Final   • Monocytes Absolute 03/21/2018 0.72  0.00 - 0.90 10*3/mm3 Final   • Eosinophils Absolute 03/21/2018 0.25  0.00 - 0.70 10*3/mm3 Final   • Basophils Absolute 03/21/2018 0.06  0.00 - 0.20 10*3/mm3 Final   • Immature Granulocyte Rel % 03/21/2018 0.5  0.0 - 0.6 % Final   • Immature Grans Absolute 03/21/2018 0.04  0.00 - 0.06 10*3/mm3 Final   • nRBC 03/21/2018 0.0  0.0 - 0.0 /100 WBC Final   • Glucose 03/21/2018 137* 74 - 98 mg/dL Final   • BUN 03/21/2018 4* 7 - 20 mg/dL Final   • Creatinine 03/21/2018 0.50* 0.60 - 1.30 mg/dL Final   • eGFR Non  Am 03/21/2018 >150  >60 mL/min/1.73 Final    Comment: Abnormal estimated GFR should be followed by more specific  studies to confirm end stage chronic renal disease. The  equation used for calculation may not be accurate for  patients less than 19 years old, greater than 70 years old,  patients at extremes of weight, malnutrition, or with acute  renal dysfunction.     • eGFR  Am 03/21/2018 >150  >60 mL/min/1.73 Final   • BUN/Creatinine Ratio 03/21/2018 8.0  6.3 - 21.9 Final   • Sodium 03/21/2018 141  137 - 145 mmol/L Final   • Potassium 03/21/2018 4.3  3.5 - 5.1 mmol/L Final   • Chloride 03/21/2018 97* 98 - 107 mmol/L Final   • Total CO2 03/21/2018 30.0  26.0 - 30.0 mmol/L Final   • Calcium 03/21/2018 9.4  8.4 - 10.2 mg/dL Final   • Total Protein 03/21/2018 7.6  6.3 - 8.2 g/dL Final   • Albumin 03/21/2018 3.80  3.50 - 5.00 g/dL Final   • Globulin 03/21/2018 3.8  gm/dL Final   • A/G Ratio 03/21/2018 1.0  1.0 - 2.0 g/dL Final   • Total Bilirubin 03/21/2018 2.1* 0.2 - 1.3 mg/dL Final   • Alkaline Phosphatase 03/21/2018 202* 38 - 126 U/L Final   • AST (SGOT)  03/21/2018 185* 15 - 46 U/L Final   • ALT (SGPT) 03/21/2018 58  13 - 69 U/L Final   Hospital Outpatient Visit on 03/21/2018   Component Date Value Ref Range Status   • Creatinine 03/21/2018 0.60  0.60 - 1.30 mg/dL Final      Ct Chest With Contrast    Result Date: 3/21/2018  Narrative: EXAMINATION: CT CHEST W CONTRAST- 03/21/2018  INDICATION: f/u scan; C77.9-Secondary and unspecified malignant neoplasm of lymph node, unspecified  TECHNIQUE: CT scan of the chest was performed following intravenous contrast.  The radiation dose reduction device was turned on for each scan per the ALARA (As Low as Reasonably Achievable) protocol.  COMPARISON: 12/04/2017  FINDINGS: There is no axillary lymphadenopathy. There is no mediastinal or hilar lymphadenopathy. There is no pericardial effusion. Images displayed at lung window settings reveal no acute inflammatory process, mass or nodule.      Impression: Negative CT scan of the chest. There has been no change since 12/04/2017.  D:  03/21/2018 E:  03/21/2018   This report was finalized on 3/21/2018 11:45 AM by Dr. Stanley Mata MD.      Ct Abdomen Pelvis With Contrast    Result Date: 3/21/2018  Narrative: EXAMINATION: CT ABDOMEN AND PELVIS WITH CONTRAST-03/21/2018:  INDICATION: F/U scan; C77.9-Secondary and unspecified malignant neoplasm of lymph node, unspecified.     TECHNIQUE: CT scan of the abdomen and pelvis was performed following intravenous contrast.  The radiation dose reduction device was turned on for each scan per the ALARA (As Low as Reasonably Achievable) protocol.  COMPARISON: 12/04/2017.  FINDINGS: There is a subtle, diffuse, heterogeneous pattern throughout the liver which appears more prominent than on previous examinations. Additionally, there is a 2 cm ill-defined area of low density in the right lobe which was not present on the initial examination. (Notation is made of repeatedly abnormal liver enzymes.)  The spleen is normal. There is no adrenal or pancreatic  "mass. There is no renal mass, stone or obstruction. There is no ascites, aneurysm or retroperitoneal lymphadenopathy. There is no pelvic mass. There is once again ill-defined soft tissue nodule in the left inguinal region, unchanged when compared with the previous examination of 12/04/2017.      Impression: 1.  As on previous examination, there is an ill-defined 1.5 cm soft tissue nodule in the left inguinal region with stranding of the surrounding fat. This is unchanged when compared with 12/04/2017. 2. There is a heterogeneous, diffuse pattern throughout the liver which appears to have slowly evolved since the initial examination of 07/17/2017. Furthermore, there is a 2 cm ill-defined area of low density in the right lobe of the liver which is new since the initial exam. Chart review demonstrates persistently elevated liver enzymes. An ultrasound of the liver is recommended for further evaluation of the liver \"texture\" and evaluation of the new area of low density.  D:  03/21/2018 E:  03/21/2018    This report was finalized on 3/21/2018 1:05 PM by Dr. Stanley Mata MD.    (  No results found.    ASSESSMENT: The patient is a very pleasant 56 y.o. male with metastatic melanoma     PROBLEM LIST:   1. Metastatic melanoma diagnosed after ultrasound-guided biopsy left inguinal lymph node March 2, 2017.  TX NX M1 B.  Disease burden left inguinal and left iliac chain lymphadenopathy.  2. Remote history of melanoma left check and left lower lip 12 and 13 years ago.  3. Started Opdivo plus Yervoy April 12, 2017, status post 3 cycles.  This was stopped secondary to colitis.   4.  Started Opdivo single agent July 26, 2017 status post 1 cycle  5.  Treatment induced colitis, grade 3, resolved  6.  Treatment induced dermatitis, grade 2  7.  Treatment induced hepatitis, grade 2   8 .  Treatment-induced thyroiditis, grade 2   9.   Alcohol abuse  10.  Uncontrolled type 2 diabetes  11. Hypertension  12. Cancer related pain  13.  " Nausea and vomiting    PLAN:  1.  I did go over the CT scan results as well as the pictures with the patient his family.  He does have new liver abnormalities does not fully characterize with the current CAT scan at this point I would like to go and order a PET scan.  2.  The patient would  like to continue to hold treatment at this point.    3.  We discussed potential side effects of immunotherapy including but not limited to immune mediated reactions with thyroiditis, pneumonitis, hepatitis, colitis, rash, and electrolytes abnormalities, fatigue, multiorgan failure, and possibly death.  4.  I will refill his Percocet 5/325 mg oxycodone 5 mg every 6 hours as needed for cancer-related pain.  I was given a new prescription today.  5.  The patient will follow up with me after the PET scan to go over results.  6.  I'll be monitoring patient blood counts kidney function liver function as well as thyroid function.  7.  I will continue the patient on Reglan 5 mg 3 times a day as well as Zofran 8 mg every 6 hours as needed for nausea.  I will add Phenergan as needed for persistent nausea.  8.   Patient will follow-up with Dr. Chacko for uncontrolled type 2 diabetes.  9.  I will continue the patient on Imodium as well as Lomotil as needed for diarrhea.  10.  I'll continue the patient on Benadryl as needed for itching  11.  I will continue patient on Zofran as needed for nausea.  12.   I will continue to taper his prednisone down, we'll go down to 30 mg daily, will continue 2 mg taper every 5 days.  13.  I will continue patient on Ambien as needed for insomnia.  14.  I will increase Synthroid 200 mg daily.  15.  I will refill his lisinopril daily for hypertension.  Kavitha Martins MD  3/28/2018  3:30 PM

## 2018-04-04 NOTE — TELEPHONE ENCOUNTER
----- Message from Olga Sales sent at 4/4/2018 10:38 AM EDT -----  Regarding: PET SCAN  Contact: [phone]  Rosangela Arroyo patients sister called and said that he could not do his PET Scan tomorrow because his sugar is 283 and he is vomiting. She wants to know what to do. If you could give her a call back.     Thanks!

## 2018-04-04 NOTE — TELEPHONE ENCOUNTER
Per Dr Martins, MRI ordered to be done with PET prior to return. Will send in zofran ODT for patient to use, since unsure if he is keeping pills down d/t nausea  Advised to contact PCP to manage glucose, since patient unable to add metformin d/t adverse effects in the past

## 2018-04-25 NOTE — PROGRESS NOTES
DATE OF VISIT: 4/25/2018    REASON FOR VISIT: Followup for Metastatic melanoma with spindle cell features      HISTORY OF PRESENT ILLNESS: The patient is a very pleasant 57 y.o. male  who was in his usual state of health until approximately February 2017. The patient presented with mass in his left groin, this has been gradually increasing in size. Patient never had this problem before. This is associated with mild pain get worse with activity and improves with laying flat. Patient went and so his primary care provider who referred him to see his surgeon. Patient so Dr. Wilson on February 28, 2017. Patient had a biopsy done March 3, 2017 that revealed metastatic melanoma.  The patient had CAT scan abdomen and pelvis done February 12, 2017 revealed left inguinal and iliac chain adenopathy.  Unfortunately insurance had declined whole body PET scan as well as MRI brain for staging purposes and there was no option.  The patient was started on Opdivo plus Yervoy on April 12, 2017. He completed 3 cycles and last dose was canceled secondary to colitis, that resolved after a short course of steroids.  He was started on Opdivo single agent July 26, 2017. He had lost to follow-up.  His repeat blood work on return showed hepatitis and he was started on another round of tapered prednisone.  The patient is here today for scheduled follow-up visit.    SUBJECTIVE: The patient has been complaining of nausea, this started after addition of metformin.  If sugar is been constant of 250.  He has been on steroids secondary to immune mediated reactions with dermatitis as well as hepatitis.  He is currently taken prednisone 30 mg daily.  His itching has improved.  He denied fever chills.  He is complaining of mild back pain.  He is requesting refill on pain medicine.    PAST MEDICAL HISTORY/SOCIAL HISTORY/FAMILY HISTORY: Unchanged from my prior documentation done on March 27 2017    Review of Systems   Constitutional: Negative for  activity change, appetite change, chills, fatigue, fever and unexpected weight change.   HENT: Negative for hearing loss, mouth sores, nosebleeds, sore throat and trouble swallowing.    Eyes: Negative for visual disturbance.   Respiratory: Negative for cough, chest tightness, shortness of breath and wheezing.    Cardiovascular: Negative for chest pain, palpitations and leg swelling.   Gastrointestinal: Negative for abdominal distention, abdominal pain, blood in stool, constipation, diarrhea, nausea, rectal pain and vomiting.   Endocrine: Negative for cold intolerance and heat intolerance.   Genitourinary: Negative for difficulty urinating, dysuria, frequency and urgency.   Musculoskeletal: Negative for arthralgias, back pain, gait problem, joint swelling and myalgias.   Skin: Negative for rash.   Neurological: Negative for dizziness, tremors, syncope, weakness, light-headedness, numbness and headaches.   Hematological: Negative for adenopathy. Does not bruise/bleed easily.   Psychiatric/Behavioral: Negative for confusion, sleep disturbance and suicidal ideas. The patient is not nervous/anxious.          Current Outpatient Prescriptions:   •  atorvastatin (LIPITOR) 20 MG tablet, Take 20 mg by mouth Daily., Disp: , Rfl:   •  diphenoxylate-atropine (LOMOTIL) 2.5-0.025 MG per tablet, Take 1 tablet by mouth Every 6 (Six) Hours As Needed for Diarrhea. 1 tablet, Disp: 30 tablet, Rfl: 5  •  glipiZIDE (GLUCOTROL) 5 MG tablet, Take 5 mg by mouth 2 (Two) Times a Day Before Meals., Disp: , Rfl:   •  hydrOXYzine (ATARAX) 25 MG tablet, Take 25 mg by mouth 3 (Three) Times a Day As Needed for Itching., Disp: , Rfl:   •  levothyroxine (SYNTHROID) 100 MCG tablet, Take 1 tablet by mouth Daily., Disp: 30 tablet, Rfl: 5  •  lisinopril (PRINIVIL,ZESTRIL) 10 MG tablet, TAKE 1 TABLET BY MOUTH DAILY., Disp: 30 tablet, Rfl: 5  •  ondansetron (ZOFRAN) 8 MG tablet, Take 1 tablet by mouth 3 (Three) Times a Day As Needed for Nausea or Vomiting.,  "Disp: 30 tablet, Rfl: 5  •  ondansetron ODT (ZOFRAN-ODT) 8 MG disintegrating tablet, Take 1 tablet by mouth Every 8 (Eight) Hours As Needed for Nausea or Vomiting for up to 60 doses., Disp: 60 tablet, Rfl: 5  •  oxyCODONE (ROXICODONE) 5 MG immediate release tablet, Take 1 tablet by mouth Every 6 (Six) Hours As Needed for Moderate Pain ., Disp: 120 tablet, Rfl: 0  •  predniSONE (DELTASONE) 10 MG tablet, Take 1 tablet by mouth Daily. 4 tabs daily for 7 days, 3 tabs for 7 days, 2 for 7 days, 1 for 7 days, then stop, Disp: 45 tablet, Rfl: 0  •  promethazine (PHENERGAN) 25 MG tablet, Take 1 tablet by mouth Every 6 (Six) Hours As Needed for Nausea or Vomiting., Disp: 45 tablet, Rfl: 5  •  zolpidem (AMBIEN) 5 MG tablet, Take 1 tablet by mouth At Night As Needed for Sleep., Disp: 30 tablet, Rfl: 2    PHYSICAL EXAMINATION:   /80   Pulse (!) 125   Temp 97.5 °F (36.4 °C) (Temporal Artery )   Resp 18   Ht 172.7 cm (68\")   Wt 83.5 kg (184 lb)   BMI 27.98 kg/m²    ECOG Performance Status: 0 - Asymptomatic  General Appearance:  alert, cooperative, no apparent distress and appears stated age   Neurologic/Psychiatric: A&O x 3, gait steady, appropriate affect, strength 5/5 in all muscle groups   HEENT:  Normocephalic, without obvious abnormality, mucous membranes moist   Neck: Supple, symmetrical, trachea midline, no adenopathy;  No thyromegaly, masses, or tenderness   Lungs:   Clear to auscultation bilaterally; respirations regular, even, and unlabored bilaterally   Heart:  Regular rate and rhythm, no murmurs appreciated   Abdomen:   Soft, non-tender, non-distended and no organomegaly   Lymph nodes: No cervical, supraclavicular, inguinal or axillary adenopathy noted   Extremities: Normal, atraumatic; no clubbing, cyanosis, or edema    Skin: No rashes, ulcers, or suspicious lesions noted     No visits with results within 2 Week(s) from this visit.   Latest known visit with results is:   Orders Only on 03/21/2018 "   Component Date Value Ref Range Status   • WBC 03/21/2018 7.39  4.80 - 10.80 10*3/mm3 Final   • RBC 03/21/2018 4.10* 4.70 - 6.10 10*6/mm3 Final   • Hemoglobin 03/21/2018 13.8* 14.0 - 18.0 g/dL Final   • Hematocrit 03/21/2018 41.0* 42.0 - 52.0 % Final   • MCV 03/21/2018 100.0* 80.0 - 94.0 fL Final   • MCH 03/21/2018 33.7* 27.0 - 31.0 pg Final   • MCHC 03/21/2018 33.7  30.0 - 37.0 g/dL Final   • RDW 03/21/2018 13.6  11.5 - 14.5 % Final   • Platelets 03/21/2018 166  130 - 400 10*3/mm3 Final   • Neutrophil Rel % 03/21/2018 72.1  37.0 - 80.0 % Final   • Lymphocyte Rel % 03/21/2018 13.5  10.0 - 50.0 % Final   • Monocyte Rel % 03/21/2018 9.7  0.0 - 12.0 % Final   • Eosinophil Rel % 03/21/2018 3.4  0.0 - 7.0 % Final   • Basophil Rel % 03/21/2018 0.8  0.0 - 2.5 % Final   • Neutrophils Absolute 03/21/2018 5.32  2.00 - 6.90 10*3/mm3 Final   • Lymphocytes Absolute 03/21/2018 1.00  0.60 - 3.40 10*3/mm3 Final   • Monocytes Absolute 03/21/2018 0.72  0.00 - 0.90 10*3/mm3 Final   • Eosinophils Absolute 03/21/2018 0.25  0.00 - 0.70 10*3/mm3 Final   • Basophils Absolute 03/21/2018 0.06  0.00 - 0.20 10*3/mm3 Final   • Immature Granulocyte Rel % 03/21/2018 0.5  0.0 - 0.6 % Final   • Immature Grans Absolute 03/21/2018 0.04  0.00 - 0.06 10*3/mm3 Final   • nRBC 03/21/2018 0.0  0.0 - 0.0 /100 WBC Final   • Glucose 03/21/2018 137* 74 - 98 mg/dL Final   • BUN 03/21/2018 4* 7 - 20 mg/dL Final   • Creatinine 03/21/2018 0.50* 0.60 - 1.30 mg/dL Final   • eGFR Non  Am 03/21/2018 >150  >60 mL/min/1.73 Final    Comment: Abnormal estimated GFR should be followed by more specific  studies to confirm end stage chronic renal disease. The  equation used for calculation may not be accurate for  patients less than 19 years old, greater than 70 years old,  patients at extremes of weight, malnutrition, or with acute  renal dysfunction.     • eGFR  Am 03/21/2018 >150  >60 mL/min/1.73 Final   • BUN/Creatinine Ratio 03/21/2018 8.0  6.3 - 21.9  Final   • Sodium 03/21/2018 141  137 - 145 mmol/L Final   • Potassium 03/21/2018 4.3  3.5 - 5.1 mmol/L Final   • Chloride 03/21/2018 97* 98 - 107 mmol/L Final   • Total CO2 03/21/2018 30.0  26.0 - 30.0 mmol/L Final   • Calcium 03/21/2018 9.4  8.4 - 10.2 mg/dL Final   • Total Protein 03/21/2018 7.6  6.3 - 8.2 g/dL Final   • Albumin 03/21/2018 3.80  3.50 - 5.00 g/dL Final   • Globulin 03/21/2018 3.8  gm/dL Final   • A/G Ratio 03/21/2018 1.0  1.0 - 2.0 g/dL Final   • Total Bilirubin 03/21/2018 2.1* 0.2 - 1.3 mg/dL Final   • Alkaline Phosphatase 03/21/2018 202* 38 - 126 U/L Final   • AST (SGOT) 03/21/2018 185* 15 - 46 U/L Final   • ALT (SGPT) 03/21/2018 58  13 - 69 U/L Final      No results found.(  No results found.    ASSESSMENT: The patient is a very pleasant 56 y.o. male with metastatic melanoma     PROBLEM LIST:   1. Metastatic melanoma diagnosed after ultrasound-guided biopsy left inguinal lymph node March 2, 2017.  TX NX M1 B.  Disease burden left inguinal and left iliac chain lymphadenopathy.  2. Remote history of melanoma left check and left lower lip 12 and 13 years ago.  3. Started Opdivo plus Yervoy April 12, 2017, status post 3 cycles.  This was stopped secondary to colitis.   4.  Started Opdivo single agent July 26, 2017 status post 1 cycle  5.  Treatment induced colitis, grade 3, resolved  6.  Treatment induced dermatitis, grade 2  7.  Treatment induced hepatitis, grade 2   8 .  Treatment-induced thyroiditis, grade 2   9.   Alcohol abuse  10.  Uncontrolled type 2 diabetes  11. Hypertension  12. Cancer related pain  13.  Nausea and vomiting    PLAN:  1.  Again I reemphasized on the importance of getting the staging workup done with MRI brain as well as whole body PET scan.  2.  The patient would  like to continue to hold treatment at this point.    3.  We discussed potential side effects of immunotherapy including but not limited to immune mediated reactions with thyroiditis, pneumonitis, hepatitis,  colitis, rash, and electrolytes abnormalities, fatigue, multiorgan failure, and possibly death.  4.  I will refill his Percocet 5/325 mg oxycodone 5 mg every 6 hours as needed for cancer-related pain.  I was given a new prescription today.  5.  The patient will follow up with me after the PET scan as well as MRI brain to go over results.  6.  I'll be monitoring patient blood counts kidney function liver function as well as thyroid function.  7.  I will continue the patient on Reglan 5 mg 3 times a day as well as Zofran 8 mg every 6 hours as needed for nausea.   8.   Patient will follow-up with Dr. Chacko for uncontrolled type 2 diabetes.  I will add Lantus 15 units daily at bedtime until he gets to see her.  9.  I will continue the patient on Imodium as well as Lomotil as needed for diarrhea.  10.  I'll continue the patient on Benadryl as needed for itching  11.  I will continue patient on Zofran as needed for nausea.  12.   I will continue to taper his prednisone down, we'll go down to 30 mg daily, will continue 10 mg taper every 7 days.  13.  I will continue patient on Ambien as needed for insomnia.  14.  I will increase Synthroid 200 mg daily.  15.  I will refill his lisinopril daily for hypertension.  Kavitha Martins MD  4/25/2018  9:08 AM

## 2018-05-16 NOTE — TELEPHONE ENCOUNTER
Rosangela states patient has been having headaches for the past couple weeks and is having some vision changes.  Discussed with Dr. Martins.  Patient really needs to have PET and MRI done.  It has been scheduled several times. Explained importance to Rosangela.   Call sent to [facility] who is working on getting scans and office visit rescheduled.

## 2018-05-16 NOTE — TELEPHONE ENCOUNTER
----- Message from Helene Mitchell sent at 5/16/2018 11:19 AM EDT -----  Regarding: JOZEF-PHONE CALL  Contact: [phone]  Rosangela patient's sister called she needs a phone call back about upcoming tests, and his vision is not good just several things going on she needs to discuss.

## 2018-05-23 NOTE — PROGRESS NOTES
DATE OF VISIT: 5/23/2018    REASON FOR VISIT: Followup for Metastatic melanoma with spindle cell features      HISTORY OF PRESENT ILLNESS: The patient is a very pleasant 57 y.o. male  who was in his usual state of health until approximately February 2017. The patient presented with mass in his left groin, this has been gradually increasing in size. Patient never had this problem before. This is associated with mild pain get worse with activity and improves with laying flat. Patient went and so his primary care provider who referred him to see his surgeon. Patient so Dr. Wilson on February 28, 2017. Patient had a biopsy done March 3, 2017 that revealed metastatic melanoma.  The patient had CAT scan abdomen and pelvis done February 12, 2017 revealed left inguinal and iliac chain adenopathy.  Unfortunately insurance had declined whole body PET scan as well as MRI brain for staging purposes and there was no option.  The patient was started on Opdivo plus Yervoy on April 12, 2017. He completed 3 cycles and last dose was canceled secondary to colitis, that resolved after a short course of steroids.  He was started on Opdivo single agent July 26, 2017. He had lost to follow-up.  His repeat blood work on return showed hepatitis and he was started on another round of tapered prednisone.  The patient is here today for scheduled follow-up visit.    SUBJECTIVE: The patient is here today with his sister and nephew.  He is complaining of progressive fatigue.  He could not get this PET scan done secondary to hyperglycemia.  He is complaining of mild back pain.  He is requesting refill on pain medicine.  He's been having poor appetite.  Sugars are running constantly between 200-300.    PAST MEDICAL HISTORY/SOCIAL HISTORY/FAMILY HISTORY: Unchanged from my prior documentation done on March 27 2017    Review of Systems   Constitutional: Negative for activity change, appetite change, chills, fatigue, fever and unexpected weight change.    HENT: Negative for hearing loss, mouth sores, nosebleeds, sore throat and trouble swallowing.    Eyes: Negative for visual disturbance.   Respiratory: Negative for cough, chest tightness, shortness of breath and wheezing.    Cardiovascular: Negative for chest pain, palpitations and leg swelling.   Gastrointestinal: Negative for abdominal distention, abdominal pain, blood in stool, constipation, diarrhea, nausea, rectal pain and vomiting.   Endocrine: Negative for cold intolerance and heat intolerance.   Genitourinary: Negative for difficulty urinating, dysuria, frequency and urgency.   Musculoskeletal: Negative for arthralgias, back pain, gait problem, joint swelling and myalgias.   Skin: Negative for rash.   Neurological: Negative for dizziness, tremors, syncope, weakness, light-headedness, numbness and headaches.   Hematological: Negative for adenopathy. Does not bruise/bleed easily.   Psychiatric/Behavioral: Negative for confusion, sleep disturbance and suicidal ideas. The patient is not nervous/anxious.          Current Outpatient Prescriptions:   •  atorvastatin (LIPITOR) 20 MG tablet, Take 20 mg by mouth Daily., Disp: , Rfl:   •  diphenoxylate-atropine (LOMOTIL) 2.5-0.025 MG per tablet, Take 1 tablet by mouth Every 6 (Six) Hours As Needed for Diarrhea. 1 tablet, Disp: 30 tablet, Rfl: 5  •  glipiZIDE (GLUCOTROL) 5 MG tablet, Take 5 mg by mouth 2 (Two) Times a Day Before Meals., Disp: , Rfl:   •  hydrOXYzine (ATARAX) 25 MG tablet, Take 25 mg by mouth 3 (Three) Times a Day As Needed for Itching., Disp: , Rfl:   •  Insulin Glargine (LANTUS SOLOSTAR) 100 UNIT/ML injection pen, Inject 15 Units under the skin Every Night., Disp: 3 mL, Rfl: 0  •  levothyroxine (SYNTHROID) 100 MCG tablet, Take 1 tablet by mouth Daily., Disp: 30 tablet, Rfl: 5  •  lisinopril (PRINIVIL,ZESTRIL) 10 MG tablet, TAKE 1 TABLET BY MOUTH DAILY., Disp: 30 tablet, Rfl: 5  •  ondansetron (ZOFRAN) 8 MG tablet, Take 1 tablet by mouth 3 (Three)  "Times a Day As Needed for Nausea or Vomiting., Disp: 30 tablet, Rfl: 5  •  ondansetron ODT (ZOFRAN-ODT) 8 MG disintegrating tablet, Take 1 tablet by mouth Every 8 (Eight) Hours As Needed for Nausea or Vomiting for up to 60 doses., Disp: 60 tablet, Rfl: 5  •  oxyCODONE (ROXICODONE) 5 MG immediate release tablet, Take 1 tablet by mouth Every 6 (Six) Hours As Needed for Moderate Pain ., Disp: 120 tablet, Rfl: 0  •  predniSONE (DELTASONE) 10 MG tablet, Take 1 tablet by mouth Daily. 4 tabs daily for 7 days, 3 tabs for 7 days, 2 for 7 days, 1 for 7 days, then stop, Disp: 45 tablet, Rfl: 0  •  promethazine (PHENERGAN) 25 MG tablet, Take 1 tablet by mouth Every 6 (Six) Hours As Needed for Nausea or Vomiting., Disp: 45 tablet, Rfl: 5  •  raNITIdine (ZANTAC) 150 MG tablet, Take 2 tablets by mouth Daily., Disp: 60 tablet, Rfl: 5  •  zolpidem (AMBIEN) 5 MG tablet, Take 1 tablet by mouth At Night As Needed for Sleep., Disp: 30 tablet, Rfl: 2    PHYSICAL EXAMINATION:   /80   Pulse 94   Temp 97.3 °F (36.3 °C) (Temporal Artery )   Resp 17   Ht 172.7 cm (68\")   Wt 83 kg (183 lb)   BMI 27.83 kg/m²    ECOG Performance Status: 0 - Asymptomatic  General Appearance:  alert, cooperative, no apparent distress and appears stated age   Neurologic/Psychiatric: A&O x 3, gait steady, appropriate affect, strength 5/5 in all muscle groups   HEENT:  Normocephalic, without obvious abnormality, mucous membranes moist, positive scleral icterus    Neck: Supple, symmetrical, trachea midline, no adenopathy;  No thyromegaly, masses, or tenderness   Lungs:   Clear to auscultation bilaterally; respirations regular, even, and unlabored bilaterally   Heart:  Regular rate and rhythm, no murmurs appreciated   Abdomen:   Soft, non-tender, non-distended and no organomegaly   Lymph nodes: No cervical, supraclavicular, inguinal or axillary adenopathy noted   Extremities: Normal, atraumatic; no clubbing, cyanosis, or edema    Skin: No rashes, ulcers, or " suspicious lesions noted     No visits with results within 2 Week(s) from this visit.   Latest known visit with results is:   Orders Only on 03/21/2018   Component Date Value Ref Range Status   • WBC 03/21/2018 7.39  4.80 - 10.80 10*3/mm3 Final   • RBC 03/21/2018 4.10* 4.70 - 6.10 10*6/mm3 Final   • Hemoglobin 03/21/2018 13.8* 14.0 - 18.0 g/dL Final   • Hematocrit 03/21/2018 41.0* 42.0 - 52.0 % Final   • MCV 03/21/2018 100.0* 80.0 - 94.0 fL Final   • MCH 03/21/2018 33.7* 27.0 - 31.0 pg Final   • MCHC 03/21/2018 33.7  30.0 - 37.0 g/dL Final   • RDW 03/21/2018 13.6  11.5 - 14.5 % Final   • Platelets 03/21/2018 166  130 - 400 10*3/mm3 Final   • Neutrophil Rel % 03/21/2018 72.1  37.0 - 80.0 % Final   • Lymphocyte Rel % 03/21/2018 13.5  10.0 - 50.0 % Final   • Monocyte Rel % 03/21/2018 9.7  0.0 - 12.0 % Final   • Eosinophil Rel % 03/21/2018 3.4  0.0 - 7.0 % Final   • Basophil Rel % 03/21/2018 0.8  0.0 - 2.5 % Final   • Neutrophils Absolute 03/21/2018 5.32  2.00 - 6.90 10*3/mm3 Final   • Lymphocytes Absolute 03/21/2018 1.00  0.60 - 3.40 10*3/mm3 Final   • Monocytes Absolute 03/21/2018 0.72  0.00 - 0.90 10*3/mm3 Final   • Eosinophils Absolute 03/21/2018 0.25  0.00 - 0.70 10*3/mm3 Final   • Basophils Absolute 03/21/2018 0.06  0.00 - 0.20 10*3/mm3 Final   • Immature Granulocyte Rel % 03/21/2018 0.5  0.0 - 0.6 % Final   • Immature Grans Absolute 03/21/2018 0.04  0.00 - 0.06 10*3/mm3 Final   • nRBC 03/21/2018 0.0  0.0 - 0.0 /100 WBC Final   • Glucose 03/21/2018 137* 74 - 98 mg/dL Final   • BUN 03/21/2018 4* 7 - 20 mg/dL Final   • Creatinine 03/21/2018 0.50* 0.60 - 1.30 mg/dL Final   • eGFR Non  Am 03/21/2018 >150  >60 mL/min/1.73 Final    Comment: Abnormal estimated GFR should be followed by more specific  studies to confirm end stage chronic renal disease. The  equation used for calculation may not be accurate for  patients less than 19 years old, greater than 70 years old,  patients at extremes of weight,  malnutrition, or with acute  renal dysfunction.     • eGFR  Am 03/21/2018 >150  >60 mL/min/1.73 Final   • BUN/Creatinine Ratio 03/21/2018 8.0  6.3 - 21.9 Final   • Sodium 03/21/2018 141  137 - 145 mmol/L Final   • Potassium 03/21/2018 4.3  3.5 - 5.1 mmol/L Final   • Chloride 03/21/2018 97* 98 - 107 mmol/L Final   • Total CO2 03/21/2018 30.0  26.0 - 30.0 mmol/L Final   • Calcium 03/21/2018 9.4  8.4 - 10.2 mg/dL Final   • Total Protein 03/21/2018 7.6  6.3 - 8.2 g/dL Final   • Albumin 03/21/2018 3.80  3.50 - 5.00 g/dL Final   • Globulin 03/21/2018 3.8  gm/dL Final   • A/G Ratio 03/21/2018 1.0  1.0 - 2.0 g/dL Final   • Total Bilirubin 03/21/2018 2.1* 0.2 - 1.3 mg/dL Final   • Alkaline Phosphatase 03/21/2018 202* 38 - 126 U/L Final   • AST (SGOT) 03/21/2018 185* 15 - 46 U/L Final   • ALT (SGPT) 03/21/2018 58  13 - 69 U/L Final      Mri Brain With & Without Contrast    Result Date: 5/18/2018  Narrative: PROCEDURE: MRI BRAIN W WO CONTRAST-  HISTORY: f/u scan, persistent nausea/vomiting; 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  FINDINGS: Diffusion sequences show no signal abnormalities to indicate acute infarct or other process.  Brain parenchyma displays a few periventricular white matter signal changes probably due to chronic microvascular change. There is no evidence of mass, hemorrhage or edema. No extra-axial abnormal findings are seen. The ventricles and cisterns appear normal. No abnormal enhancing lesions are identified on the post infusion images.  No obvious skull lesions are seen. No mass is identified.      Impression: 1. No evidence of mass or hydrocephalus 2. No evidence of calvarial lesion 3. A few nonspecific white matter signal changes probably due to chronic microvascular change  This report was finalized on 5/18/2018 4:29 PM by Chintan Pan MD.  (  No results  found.    ASSESSMENT: The patient is a very pleasant 56 y.o. male with metastatic melanoma     PROBLEM LIST:   1. Metastatic melanoma diagnosed after ultrasound-guided biopsy left inguinal lymph node March 2, 2017.  TX NX M1 B.  Disease burden left inguinal and left iliac chain lymphadenopathy.  2. Remote history of melanoma left check and left lower lip 12 and 13 years ago.  3. Started Opdivo plus Yervoy April 12, 2017, status post 3 cycles.  This was stopped secondary to colitis.   4.  Started Opdivo single agent July 26, 2017 status post 1 cycle  5.  Treatment induced colitis, grade 3, resolved  6.  Treatment induced dermatitis, grade 2  7.  Treatment induced hepatitis, grade 2   8 .  Treatment-induced thyroiditis, grade 2   9.   Alcohol abuse  10.  Uncontrolled type 2 diabetes  11. Hypertension  12. Cancer related pain  13.  Nausea and vomiting    PLAN:  1.  Again I reemphasized on the importance of getting the staging workup done with whole body PET scan.  I explained to him that his jaundice could be induced by Opdivo versus disease progression with liver metastases.  2.  The patient would  like to continue to hold treatment at this point.    3.  We discussed potential side effects of immunotherapy including but not limited to immune mediated reactions with thyroiditis, pneumonitis, hepatitis, colitis, rash, and electrolytes abnormalities, fatigue, multiorgan failure, and possibly death.  4.  I will refill his oxycodone 5 mg every 6 hours as needed for cancer-related pain.  He was given a new prescription today.  5.  The patient will follow up with me after the PET scan.  6.  I'll be monitoring patient blood counts kidney function liver function as well as thyroid function.  7.  I will continue the patient on Reglan 5 mg 3 times a day as well as Zofran 8 mg every 6 hours as needed for nausea.   8.   Patient will follow-up with Dr. Chacko for uncontrolled type 2 diabetes.  I will increase his Lantus 15  units BID at bedtime until he gets to see her.  9.  I will continue the patient on Imodium as well as Lomotil as needed for diarrhea.  10.  I'll continue the patient on Benadryl as needed for itching  11.  I will continue patient on Zofran as needed for nausea.  12.   I will continue to taper his prednisone down, we'll go down to 10 mg daily for now.  I will consider increasing the dose if his PET scan was more consistent with hepatitis rather than progressive disease.  13.  I will continue patient on Ambien as needed for insomnia.  14.  I will continue Synthroid 200 mg daily.  15.  I will continue lisinopril daily for hypertension.  16.  I did go over the MRI brain results with the patient and his family, I explained to him there is no evidence of metastases.  Kavitha Martins MD  5/23/2018  2:42 PM

## 2018-06-12 NOTE — TELEPHONE ENCOUNTER
----- Message from Gogo Saeed sent at 6/12/2018 10:24 AM EDT -----  Regarding: DR JOZEF Adames pt sister called today to let us know that Mr Arroyo is in the hospital at . I understood from sister he was admit but then she said they were in the ER at .Also she  Want  to cancel Pet scan for tomorrow.I called and canceled PET scan.     Pt has been having swelling in his stomach and legs. Primary physician said to go to hospital. Pt went to [facility]. Where he was transfer to . At  they removed 5 liter of fluid from his stomach. Also stated that his liver was failing .    Rosangela(sister) number is [phone].

## 2018-06-27 NOTE — TELEPHONE ENCOUNTER
Rosangela advised that we can refill patient's medication, and she will be by to pick it up today.  States that patient has been d/c from [facility] for a couple of days, but is not doing well. Saw PCP today, and was given scripts for hospital bed, commode, and wheel chair.  Discussed home health or hospice assistance with Rosangela, but she states that she would like to see what support she and family can provide for him before getting them involved.   Told her she can call at any time if she needs anything or if patient needs to be seen by us sooner, and we can accomodate

## 2018-06-27 NOTE — TELEPHONE ENCOUNTER
----- Message from Gogo Saeed sent at 6/27/2018  1:36 PM EDT -----  Regarding: DR HASKINS MEDICATION REFILL  PT SISTER CALLED ASKED FOR REFILL FOR PAIN MEDICATION     PHONE [phone]    THANKS

## 2018-07-06 NOTE — TELEPHONE ENCOUNTER
----- Message from Cody Manley Rep sent at 7/6/2018  3:37 PM EDT -----  Regarding: [facility]  Angely with [facility] called and wanted to know if she could get a script for liquid morphine & ativan. She can be reached @ [phone]

## 2018-07-06 NOTE — TELEPHONE ENCOUNTER
Angely metz will send script for roxanol to [facility] in [place] as well as [facility]. Verbal given for ativan solution

## 2018-07-06 NOTE — TELEPHONE ENCOUNTER
----- Message from Gogo Saeed sent at 7/6/2018  9:42 AM EDT -----  Regarding: DR HASKINS, HOSPICE REQUEST  PT sister (ROSANGELA) called today requesting Hospice for brother.  They have a hospital bed coming today. They also have a wheel chair, walker,and bed side toilet. His sister and his nephew are currently taking care of him . He not able to get to bedside toilet. She also stated he is in bad pain and moaning.    Rosangela phone is [phone]

## 2019-08-23 NOTE — TELEPHONE ENCOUNTER
.   Dr Martins advised of patient admission in . Will have scheduling move his appt to the following week if patient is discharged